# Patient Record
Sex: FEMALE | Race: WHITE | NOT HISPANIC OR LATINO | Employment: FULL TIME | URBAN - METROPOLITAN AREA
[De-identification: names, ages, dates, MRNs, and addresses within clinical notes are randomized per-mention and may not be internally consistent; named-entity substitution may affect disease eponyms.]

---

## 2020-08-14 ENCOUNTER — HOSPITAL ENCOUNTER (OUTPATIENT)
Dept: OTHER | Facility: HOSPITAL | Age: 45
Discharge: HOME | End: 2020-08-14
Attending: EMERGENCY MEDICINE

## 2020-08-15 LAB — RUBV IGG SER-ACNC: 126.9 [IU]/ML

## 2020-08-16 LAB — HBV SURFACE AB SER-ACNC: 363.3 MIU/ML

## 2020-08-17 LAB
CONV MUMPS ANTIBODY IGG: <9 AU/ML
MEV IGG SER IA-ACNC: 49.8 AU/ML
VZV IGG SER IA-ACNC: 420 INDEX

## 2020-08-19 LAB
CONV QUANTIFERON TB GOLD: NEGATIVE
QUANTIFERON CRITERIA: NORMAL
QUANTIFERON MITOGEN VALUE: >10 IU/ML
QUANTIFERON NIL VALUE: 0.06 IU/ML
QUANTIFERON TB1 AG VALUE: 0.06 IU/ML
QUANTIFERON TB2 AG VALUE: 0.07 IU/ML

## 2021-01-06 ENCOUNTER — HOSPITAL ENCOUNTER (OUTPATIENT)
Dept: OTHER | Facility: HOSPITAL | Age: 46
Discharge: HOME | End: 2021-01-06
Attending: INTERNAL MEDICINE

## 2021-02-02 ENCOUNTER — HOSPITAL ENCOUNTER (OUTPATIENT)
Dept: VACCINE CLINIC | Facility: HOSPITAL | Age: 46
Discharge: HOME | End: 2021-02-02
Attending: INTERNAL MEDICINE

## 2022-02-10 ENCOUNTER — LAB (OUTPATIENT)
Dept: LAB | Facility: HOSPITAL | Age: 47
End: 2022-02-10

## 2022-02-10 ENCOUNTER — TRANSCRIBE ORDERS (OUTPATIENT)
Dept: LAB | Facility: HOSPITAL | Age: 47
End: 2022-02-10

## 2022-02-10 DIAGNOSIS — Z79.899 ENCOUNTER FOR LONG-TERM (CURRENT) USE OF OTHER MEDICATIONS: ICD-10-CM

## 2022-02-10 DIAGNOSIS — E03.9 HYPOTHYROIDISM, UNSPECIFIED TYPE: ICD-10-CM

## 2022-02-10 DIAGNOSIS — E03.9 HYPOTHYROIDISM, UNSPECIFIED TYPE: Primary | ICD-10-CM

## 2022-02-10 LAB
BACTERIA UR QL AUTO: NORMAL /HPF
BASOPHILS # BLD AUTO: 0.09 10*3/MM3 (ref 0–0.2)
BASOPHILS NFR BLD AUTO: 1.2 % (ref 0–1.5)
BILIRUB UR QL STRIP: NEGATIVE
CLARITY UR: CLEAR
COLOR UR: YELLOW
CRP SERPL-MCNC: 1.49 MG/DL (ref 0–0.5)
DEPRECATED RDW RBC AUTO: 39.8 FL (ref 37–54)
EOSINOPHIL # BLD AUTO: 0.13 10*3/MM3 (ref 0–0.4)
EOSINOPHIL NFR BLD AUTO: 1.7 % (ref 0.3–6.2)
ERYTHROCYTE [DISTWIDTH] IN BLOOD BY AUTOMATED COUNT: 14 % (ref 12.3–15.4)
ERYTHROCYTE [SEDIMENTATION RATE] IN BLOOD: 18 MM/HR (ref 0–20)
GLUCOSE UR STRIP-MCNC: NEGATIVE MG/DL
HCT VFR BLD AUTO: 33.9 % (ref 34–46.6)
HCV AB SER DONR QL: NORMAL
HGB BLD-MCNC: 11.1 G/DL (ref 12–15.9)
HGB UR QL STRIP.AUTO: NEGATIVE
HYALINE CASTS UR QL AUTO: NORMAL /LPF
IMM GRANULOCYTES # BLD AUTO: 0.01 10*3/MM3 (ref 0–0.05)
IMM GRANULOCYTES NFR BLD AUTO: 0.1 % (ref 0–0.5)
KETONES UR QL STRIP: NEGATIVE
LEUKOCYTE ESTERASE UR QL STRIP.AUTO: ABNORMAL
LYMPHOCYTES # BLD AUTO: 2.17 10*3/MM3 (ref 0.7–3.1)
LYMPHOCYTES NFR BLD AUTO: 29 % (ref 19.6–45.3)
MCH RBC QN AUTO: 26.1 PG (ref 26.6–33)
MCHC RBC AUTO-ENTMCNC: 32.7 G/DL (ref 31.5–35.7)
MCV RBC AUTO: 79.8 FL (ref 79–97)
MONOCYTES # BLD AUTO: 0.49 10*3/MM3 (ref 0.1–0.9)
MONOCYTES NFR BLD AUTO: 6.5 % (ref 5–12)
NEUTROPHILS NFR BLD AUTO: 4.6 10*3/MM3 (ref 1.7–7)
NEUTROPHILS NFR BLD AUTO: 61.5 % (ref 42.7–76)
NITRITE UR QL STRIP: NEGATIVE
NRBC BLD AUTO-RTO: 0 /100 WBC (ref 0–0.2)
PH UR STRIP.AUTO: 8.5 [PH] (ref 5–8)
PLATELET # BLD AUTO: 327 10*3/MM3 (ref 140–450)
PMV BLD AUTO: 11.1 FL (ref 6–12)
PROT UR QL STRIP: ABNORMAL
RBC # BLD AUTO: 4.25 10*6/MM3 (ref 3.77–5.28)
RBC # UR STRIP: NORMAL /HPF
REF LAB TEST METHOD: NORMAL
SP GR UR STRIP: 1.02 (ref 1–1.03)
SQUAMOUS #/AREA URNS HPF: NORMAL /HPF
T4 FREE SERPL-MCNC: 0.91 NG/DL (ref 0.93–1.7)
TSH SERPL DL<=0.05 MIU/L-ACNC: 1.65 UIU/ML (ref 0.27–4.2)
UROBILINOGEN UR QL STRIP: ABNORMAL
WBC # UR STRIP: NORMAL /HPF
WBC NRBC COR # BLD: 7.49 10*3/MM3 (ref 3.4–10.8)

## 2022-02-10 PROCEDURE — 81001 URINALYSIS AUTO W/SCOPE: CPT

## 2022-02-10 PROCEDURE — 86140 C-REACTIVE PROTEIN: CPT

## 2022-02-10 PROCEDURE — 84432 ASSAY OF THYROGLOBULIN: CPT

## 2022-02-10 PROCEDURE — 85025 COMPLETE CBC W/AUTO DIFF WBC: CPT

## 2022-02-10 PROCEDURE — 84439 ASSAY OF FREE THYROXINE: CPT

## 2022-02-10 PROCEDURE — 86376 MICROSOMAL ANTIBODY EACH: CPT

## 2022-02-10 PROCEDURE — 86803 HEPATITIS C AB TEST: CPT

## 2022-02-10 PROCEDURE — 85652 RBC SED RATE AUTOMATED: CPT

## 2022-02-10 PROCEDURE — 84443 ASSAY THYROID STIM HORMONE: CPT

## 2022-02-10 PROCEDURE — 36415 COLL VENOUS BLD VENIPUNCTURE: CPT

## 2022-02-11 LAB — THYROPEROXIDASE AB SERPL-ACNC: <8 IU/ML (ref 0–34)

## 2022-02-24 LAB — THYROGLOB SERPL-MCNC: 9.1 NG/ML

## 2022-07-18 ENCOUNTER — OFFICE VISIT (OUTPATIENT)
Dept: FAMILY MEDICINE CLINIC | Facility: CLINIC | Age: 47
End: 2022-07-18

## 2022-07-18 VITALS
OXYGEN SATURATION: 100 % | HEART RATE: 54 BPM | WEIGHT: 222.4 LBS | TEMPERATURE: 98.1 F | DIASTOLIC BLOOD PRESSURE: 84 MMHG | BODY MASS INDEX: 34.91 KG/M2 | SYSTOLIC BLOOD PRESSURE: 122 MMHG | HEIGHT: 67 IN

## 2022-07-18 DIAGNOSIS — D64.9 ANEMIA, UNSPECIFIED TYPE: ICD-10-CM

## 2022-07-18 DIAGNOSIS — E03.9 HYPOTHYROIDISM, UNSPECIFIED TYPE: ICD-10-CM

## 2022-07-18 DIAGNOSIS — Z51.81 MEDICATION MONITORING ENCOUNTER: ICD-10-CM

## 2022-07-18 DIAGNOSIS — E78.5 BORDERLINE HYPERLIPIDEMIA: ICD-10-CM

## 2022-07-18 DIAGNOSIS — F41.9 ANXIETY: Primary | ICD-10-CM

## 2022-07-18 DIAGNOSIS — L21.9 SEBORRHEIC DERMATITIS: ICD-10-CM

## 2022-07-18 DIAGNOSIS — F32.A DEPRESSION, UNSPECIFIED DEPRESSION TYPE: ICD-10-CM

## 2022-07-18 DIAGNOSIS — Z12.31 VISIT FOR SCREENING MAMMOGRAM: ICD-10-CM

## 2022-07-18 DIAGNOSIS — E55.9 VITAMIN D DEFICIENCY: ICD-10-CM

## 2022-07-18 DIAGNOSIS — R41.840 INATTENTION: ICD-10-CM

## 2022-07-18 DIAGNOSIS — J30.9 ALLERGIC RHINITIS, UNSPECIFIED SEASONALITY, UNSPECIFIED TRIGGER: ICD-10-CM

## 2022-07-18 PROCEDURE — 99204 OFFICE O/P NEW MOD 45 MIN: CPT | Performed by: FAMILY MEDICINE

## 2022-07-18 RX ORDER — BUSPIRONE HYDROCHLORIDE 7.5 MG/1
7.5 TABLET ORAL 2 TIMES DAILY
Qty: 60 TABLET | Refills: 1 | Status: SHIPPED | OUTPATIENT
Start: 2022-07-18 | End: 2022-08-18

## 2022-07-18 RX ORDER — KETOCONAZOLE 20 MG/ML
SHAMPOO TOPICAL 2 TIMES WEEKLY
Qty: 120 ML | Refills: 0 | Status: SHIPPED | OUTPATIENT
Start: 2022-07-18 | End: 2022-12-12

## 2022-07-18 RX ORDER — BUPROPION HCL 300 MG
TABLET, EXTENDED RELEASE 24 HR ORAL
COMMUNITY
Start: 2022-05-11 | End: 2022-07-22 | Stop reason: SDUPTHER

## 2022-07-18 NOTE — PROGRESS NOTES
Chief Complaint  Establish Care  Discuss medications  Concerns about adhd  Skin rash    Subjective        Maine Olguin presents to [facility]  History of Present Illness  Patient presents today to establish care with myself.  Previously being seen on [place].  She reports a history of postpartum anxiety and potentially some issues with depression as well.  She was recently increased on Wellbutrin to take 300 mg daily.  This was an increase from the previous 150 mg.  It was changed about 6 or so weeks ago.  She thinks she has had some benefit from the increase but is not quite sure yet.  She does not take anything actively for anxiety as we did discuss that Wellbutrin is typically not used for anxiety.  She does have concerns about ADHD.  She reports that others have told her this concern as well.  She did fill out a screening today where she had issues in 4 out of the 6 areas.  She admits that the Wellbutrin has not helped out for ADHD.  As far as anxiety and depression are concerned she reports that she had side effects taking Paxil, sertraline and Effexor and that Wellbutrin has been 1 medication and is somewhat helped.  She admits to not getting regular care and would like to have some of these issues sorted out.  She was previously seen by rheumatology, Dr. Ruffin as she had an elevated CRP and NGUYEN.  She was not diagnosed with any rheumatological condition per patient report.  She has a rash around her face that has been recurring.  She reports that the topical hydrocortisone that is 2.5% has helped out but she reports that it will come back.  She was also previously given ketoconazole shampoo.  She reports being seen by dermatology so we will request the records.  I reviewed her labs she has had anemia.  She reports that her hemoglobin is always around 11.  She also reports previous issues with vitamin D deficiency and hyperlipidemia.  Plan to have her labs done for further  "evaluation.  She is due for mammogram.  She denies any abnormal mammograms.  She does report previously having thyroid levels that were off.  She reports that the free T4 and a TSH were low.  Back in February she had a normal TSH with a slightly low free T4 at 0.91.  I plan to have this lab also repeated.  She did have thyroid peroxidase antibodies checked which were negative as well as thyroglobulin antibodies which were negative.    Objective   Vital Signs:  /84   Pulse 54   Temp 98.1 °F (36.7 °C)   Ht 170.2 cm (67\")   Wt 101 kg (222 lb 6.4 oz)   SpO2 100%   BMI 34.83 kg/m²   Estimated body mass index is 34.83 kg/m² as calculated from the following:    Height as of this encounter: 170.2 cm (67\").    Weight as of this encounter: 101 kg (222 lb 6.4 oz).          Physical Exam   General: AAO ×3, no acute distress, pleasant  HEENT: Normocephalic, atraumatic, no discharge in the eyes, no discharge from the nose, no oropharyngeal erythema or exudates, and TMs intact bilaterally with no erythema, no cervical tenderness or lymphadenopathy  Skin: Seborrheic dermatitis noted around her eyes and bridge of nose.  There is erythema with slight scale.  Cardiovascular: Regular rate and rhythm without appreciable murmur  Respiratory: Clear to auscultation bilaterally no RRW  Gastrointestinal: Soft nontender nondistended with bowel sounds present  extremities: No edema  Neurologic: CN II through XII grossly intact   Psychiatric: Normal mood and affect  Result Review :                Assessment and Plan   Diagnoses and all orders for this visit:    1. Anxiety (Primary)    2. Hypothyroidism, unspecified type  -     TSH+Free T4; Future    3. Allergic rhinitis, unspecified seasonality, unspecified trigger    4. Inattention    5. Visit for screening mammogram  -     Mammo Screening Digital Tomosynthesis Bilateral With CAD; Future    6. Seborrheic dermatitis    7. Anemia, unspecified type  -     Vitamin B12; Future  -     " Folate; Future  -     CBC & Differential; Future  -     Comprehensive Metabolic Panel; Future  -     Iron Profile; Future    8. Vitamin D deficiency  -     Vitamin D 25 Hydroxy; Future    9. Borderline hyperlipidemia  -     Lipid Panel; Future    10. Medication monitoring encounter  -     CBC & Differential; Future  -     Comprehensive Metabolic Panel; Future    11. Depression, unspecified depression type    Other orders  -     busPIRone (BUSPAR) 7.5 MG tablet; Take 1 tablet by mouth 2 (Two) Times a Day.  Dispense: 60 tablet; Refill: 1  -     ketoconazole (NIZORAL) 2 % shampoo; Apply  topically to the appropriate area as directed 2 (Two) Times a Week.  Dispense: 120 mL; Refill: 0      I discussed with patient seeing her back in 1 month for close follow-up.  For anxiety I would like to start her on BuSpar 7.5 mg twice daily.  We discussed continue with Wellbutrin at this time with further recommendations to follow depending on clinical course.  I did encourage her to get her labs done prior to the next appointment.  There are some concerns about inattention.  We will hold off on treatment at this time but may consider this in the future.  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  I did discuss with patient screening for colon cancer.  She would like to think about the options provided today including Cologuard and a colonoscopy.       Follow Up   Return in about 1 month (around 8/18/2022) for anxiety/depression.  Patient was given instructions and counseling regarding her condition or for health maintenance advice. Please see specific information pulled into the AVS if appropriate.

## 2022-07-21 ENCOUNTER — PATIENT MESSAGE (OUTPATIENT)
Dept: FAMILY MEDICINE CLINIC | Facility: CLINIC | Age: 47
End: 2022-07-21

## 2022-07-22 ENCOUNTER — PATIENT ROUNDING (BHMG ONLY) (OUTPATIENT)
Dept: FAMILY MEDICINE CLINIC | Facility: CLINIC | Age: 47
End: 2022-07-22

## 2022-07-22 RX ORDER — BUPROPION HCL 300 MG
300 TABLET, EXTENDED RELEASE 24 HR ORAL DAILY
Qty: 90 TABLET | Refills: 1 | Status: SHIPPED | OUTPATIENT
Start: 2022-07-22 | End: 2022-07-25 | Stop reason: SDUPTHER

## 2022-07-22 NOTE — TELEPHONE ENCOUNTER
From: Maine Olguin  To: Tanmay Mayberry DO  Sent: 7/21/2022 8:35 AM EDT  Subject: Wellbutrin Prescription    Good morning, Dr. Mayberry,    I may have not told you at my visit this week that I need a prescription for the Wellbutrin HCl 300mg XL. My current fill from my previous provider only has five tablets remaining.    Thank you,  Maine

## 2022-07-22 NOTE — PROGRESS NOTES
July 22, 2022    Hello, may I speak with Maine Olguin?    My name is Linus Galvez    I am  with [facility]  [address]  [address]  [place] [address]  [phone].    Before we get started may I verify your date of birth? 1975    I am calling to officially welcome you to our practice and ask about your recent visit. Is this a good time to talk? yes    Tell me about your visit with us. What things went well?  pt was upset that she had to do the new pt paper work more than once. I loved my provider the paperwork was more of an inconvenience        We're always looking for ways to make our patients' experiences even better. Do you have recommendations on ways we may improve?  no    Overall were you satisfied with your first visit to our practice? yes       I appreciate you taking the time to speak with me today. Is there anything else I can do for you? no      Thank you, and have a great day.

## 2022-07-25 RX ORDER — BUPROPION HYDROCHLORIDE 300 MG/1
300 TABLET ORAL DAILY
Qty: 90 TABLET | Refills: 1 | Status: SHIPPED | OUTPATIENT
Start: 2022-07-25 | End: 2022-08-18 | Stop reason: SDUPTHER

## 2022-07-25 RX ORDER — BUPROPION HYDROCHLORIDE 300 MG/1
300 TABLET ORAL DAILY
Qty: 90 TABLET | Refills: 1 | Status: SHIPPED | OUTPATIENT
Start: 2022-07-25 | End: 2022-07-25

## 2022-08-17 ENCOUNTER — LAB (OUTPATIENT)
Dept: LAB | Facility: HOSPITAL | Age: 47
End: 2022-08-17

## 2022-08-17 DIAGNOSIS — E03.9 HYPOTHYROIDISM, UNSPECIFIED TYPE: ICD-10-CM

## 2022-08-17 DIAGNOSIS — E55.9 VITAMIN D DEFICIENCY: ICD-10-CM

## 2022-08-17 DIAGNOSIS — E78.5 BORDERLINE HYPERLIPIDEMIA: ICD-10-CM

## 2022-08-17 DIAGNOSIS — D64.9 ANEMIA, UNSPECIFIED TYPE: ICD-10-CM

## 2022-08-17 DIAGNOSIS — Z51.81 MEDICATION MONITORING ENCOUNTER: ICD-10-CM

## 2022-08-17 LAB
25(OH)D3 SERPL-MCNC: 38.4 NG/ML (ref 30–100)
ALBUMIN SERPL-MCNC: 4.4 G/DL (ref 3.5–5.2)
ALBUMIN/GLOB SERPL: 1.5 G/DL
ALP SERPL-CCNC: 96 U/L (ref 39–117)
ALT SERPL W P-5'-P-CCNC: 10 U/L (ref 1–33)
ANION GAP SERPL CALCULATED.3IONS-SCNC: 11.9 MMOL/L (ref 5–15)
AST SERPL-CCNC: 13 U/L (ref 1–32)
BASOPHILS # BLD AUTO: 0.08 10*3/MM3 (ref 0–0.2)
BASOPHILS NFR BLD AUTO: 1.3 % (ref 0–1.5)
BILIRUB SERPL-MCNC: 0.3 MG/DL (ref 0–1.2)
BUN SERPL-MCNC: 13 MG/DL (ref 6–20)
BUN/CREAT SERPL: 14.1 (ref 7–25)
CALCIUM SPEC-SCNC: 9.6 MG/DL (ref 8.6–10.5)
CHLORIDE SERPL-SCNC: 99 MMOL/L (ref 98–107)
CHOLEST SERPL-MCNC: 181 MG/DL (ref 0–200)
CO2 SERPL-SCNC: 27.1 MMOL/L (ref 22–29)
CREAT SERPL-MCNC: 0.92 MG/DL (ref 0.57–1)
DEPRECATED RDW RBC AUTO: 40.9 FL (ref 37–54)
EGFRCR SERPLBLD CKD-EPI 2021: 78.4 ML/MIN/1.73
EOSINOPHIL # BLD AUTO: 0.1 10*3/MM3 (ref 0–0.4)
EOSINOPHIL NFR BLD AUTO: 1.6 % (ref 0.3–6.2)
ERYTHROCYTE [DISTWIDTH] IN BLOOD BY AUTOMATED COUNT: 13.8 % (ref 12.3–15.4)
FOLATE SERPL-MCNC: 14.5 NG/ML (ref 4.78–24.2)
GLOBULIN UR ELPH-MCNC: 2.9 GM/DL
GLUCOSE SERPL-MCNC: 103 MG/DL (ref 65–99)
HCT VFR BLD AUTO: 36.7 % (ref 34–46.6)
HDLC SERPL-MCNC: 48 MG/DL (ref 40–60)
HGB BLD-MCNC: 11.9 G/DL (ref 12–15.9)
IMM GRANULOCYTES # BLD AUTO: 0.02 10*3/MM3 (ref 0–0.05)
IMM GRANULOCYTES NFR BLD AUTO: 0.3 % (ref 0–0.5)
IRON 24H UR-MRATE: 41 MCG/DL (ref 37–145)
IRON SATN MFR SERPL: 11 % (ref 20–50)
LDLC SERPL CALC-MCNC: 114 MG/DL (ref 0–100)
LDLC/HDLC SERPL: 2.34 {RATIO}
LYMPHOCYTES # BLD AUTO: 2.04 10*3/MM3 (ref 0.7–3.1)
LYMPHOCYTES NFR BLD AUTO: 32.5 % (ref 19.6–45.3)
MCH RBC QN AUTO: 26.7 PG (ref 26.6–33)
MCHC RBC AUTO-ENTMCNC: 32.4 G/DL (ref 31.5–35.7)
MCV RBC AUTO: 82.3 FL (ref 79–97)
MONOCYTES # BLD AUTO: 0.48 10*3/MM3 (ref 0.1–0.9)
MONOCYTES NFR BLD AUTO: 7.6 % (ref 5–12)
NEUTROPHILS NFR BLD AUTO: 3.56 10*3/MM3 (ref 1.7–7)
NEUTROPHILS NFR BLD AUTO: 56.7 % (ref 42.7–76)
NRBC BLD AUTO-RTO: 0 /100 WBC (ref 0–0.2)
PLATELET # BLD AUTO: 325 10*3/MM3 (ref 140–450)
PMV BLD AUTO: 10.9 FL (ref 6–12)
POTASSIUM SERPL-SCNC: 4 MMOL/L (ref 3.5–5.2)
PROT SERPL-MCNC: 7.3 G/DL (ref 6–8.5)
RBC # BLD AUTO: 4.46 10*6/MM3 (ref 3.77–5.28)
SODIUM SERPL-SCNC: 138 MMOL/L (ref 136–145)
T4 FREE SERPL-MCNC: 1.09 NG/DL (ref 0.93–1.7)
TIBC SERPL-MCNC: 389 MCG/DL (ref 298–536)
TRANSFERRIN SERPL-MCNC: 261 MG/DL (ref 200–360)
TRIGL SERPL-MCNC: 104 MG/DL (ref 0–150)
TSH SERPL DL<=0.05 MIU/L-ACNC: 1.93 UIU/ML (ref 0.27–4.2)
VIT B12 BLD-MCNC: 469 PG/ML (ref 211–946)
VLDLC SERPL-MCNC: 19 MG/DL (ref 5–40)
WBC NRBC COR # BLD: 6.28 10*3/MM3 (ref 3.4–10.8)

## 2022-08-17 PROCEDURE — 80053 COMPREHEN METABOLIC PANEL: CPT

## 2022-08-17 PROCEDURE — 84466 ASSAY OF TRANSFERRIN: CPT

## 2022-08-17 PROCEDURE — 84443 ASSAY THYROID STIM HORMONE: CPT

## 2022-08-17 PROCEDURE — 84439 ASSAY OF FREE THYROXINE: CPT

## 2022-08-17 PROCEDURE — 80061 LIPID PANEL: CPT

## 2022-08-17 PROCEDURE — 85025 COMPLETE CBC W/AUTO DIFF WBC: CPT

## 2022-08-17 PROCEDURE — 36415 COLL VENOUS BLD VENIPUNCTURE: CPT

## 2022-08-17 PROCEDURE — 82607 VITAMIN B-12: CPT

## 2022-08-17 PROCEDURE — 82746 ASSAY OF FOLIC ACID SERUM: CPT

## 2022-08-17 PROCEDURE — 82306 VITAMIN D 25 HYDROXY: CPT

## 2022-08-17 PROCEDURE — 83540 ASSAY OF IRON: CPT

## 2022-08-18 ENCOUNTER — OFFICE VISIT (OUTPATIENT)
Dept: FAMILY MEDICINE CLINIC | Facility: CLINIC | Age: 47
End: 2022-08-18

## 2022-08-18 VITALS
HEART RATE: 55 BPM | TEMPERATURE: 98.1 F | HEIGHT: 67 IN | SYSTOLIC BLOOD PRESSURE: 122 MMHG | DIASTOLIC BLOOD PRESSURE: 88 MMHG | BODY MASS INDEX: 34.75 KG/M2 | WEIGHT: 221.4 LBS | OXYGEN SATURATION: 100 %

## 2022-08-18 DIAGNOSIS — E78.00 PURE HYPERCHOLESTEROLEMIA: ICD-10-CM

## 2022-08-18 DIAGNOSIS — D50.9 IRON DEFICIENCY ANEMIA, UNSPECIFIED IRON DEFICIENCY ANEMIA TYPE: ICD-10-CM

## 2022-08-18 DIAGNOSIS — F32.A DEPRESSION, UNSPECIFIED DEPRESSION TYPE: ICD-10-CM

## 2022-08-18 DIAGNOSIS — Z12.11 SCREENING FOR COLON CANCER: ICD-10-CM

## 2022-08-18 DIAGNOSIS — F41.9 ANXIETY: Primary | ICD-10-CM

## 2022-08-18 PROCEDURE — 99214 OFFICE O/P EST MOD 30 MIN: CPT | Performed by: FAMILY MEDICINE

## 2022-08-18 RX ORDER — BUPROPION HYDROCHLORIDE 300 MG/1
300 TABLET ORAL DAILY
Qty: 90 TABLET | Refills: 1 | Status: SHIPPED | OUTPATIENT
Start: 2022-08-18

## 2022-08-18 RX ORDER — BUSPIRONE HYDROCHLORIDE 15 MG/1
15 TABLET ORAL 2 TIMES DAILY
Qty: 180 TABLET | Refills: 1 | Status: SHIPPED | OUTPATIENT
Start: 2022-08-18

## 2022-08-18 NOTE — PROGRESS NOTES
"Chief Complaint  Anxiety   Depression  Discuss lab work    Subjective        Maine Olguin presents to [facility]  History of Present Illness  Patient presents today to follow-up for anxiety and depression.  She started taking BuSpar which she admits has provided some benefit.  She still sometimes will feel on the edge.  She is taking Wellbutrin has been on previous antidepressants as well.  We did discuss adding counseling today to help out with depression.  She is here to discuss her lab work.  Her lipid panel showed her LDL was at 114.  We discussed lifestyle changes.  She has mild anemia with a low iron saturation.  She reports that she has been anemic most of her adult life.  We did discuss taking a multivitamin containing iron.  We did discuss on the last visit colon cancer screening and she is agreeable to a Cologuard test.  She does have a slightly elevated glucose level but normal renal function normal LFTs.  CBC shows hemoglobin 11.9.  Folate, B12, vitamin D and thyroid levels are all adequate.  Objective   Vital Signs:  /88   Pulse 55   Temp 98.1 °F (36.7 °C)   Ht 170.2 cm (67\")   Wt 100 kg (221 lb 6.4 oz)   SpO2 100%   BMI 34.68 kg/m²   Estimated body mass index is 34.68 kg/m² as calculated from the following:    Height as of this encounter: 170.2 cm (67\").    Weight as of this encounter: 100 kg (221 lb 6.4 oz).          Physical Exam   General: AAO ×3, no acute distress, pleasant  HEENT: Normocephalic, atraumatic  Cardiovascular: Regular rate and rhythm without appreciable murmur  Respiratory: Clear to auscultation bilaterally no RRW  Gastrointestinal: Soft nontender nondistended with bowel sounds present  extremities: No edema  Neurologic: CN II through XII grossly intact   Psychiatric: Normal mood and affect  Result Review :{Labs  Result Review  Imaging  Med Tab  Media  Procedures  :23}                Assessment and Plan   Diagnoses and all orders " for this visit:    1. Anxiety (Primary)    2. Depression, unspecified depression type    3. Pure hypercholesterolemia    4. Iron deficiency anemia, unspecified iron deficiency anemia type    5. Screening for colon cancer  -     Cologuard - Stool, Per Rectum; Future    Other orders  -     busPIRone (BUSPAR) 15 MG tablet; Take 1 tablet by mouth 2 (Two) Times a Day.  Dispense: 180 tablet; Refill: 1  -     buPROPion XL (Wellbutrin XL) 300 MG 24 hr tablet; Take 1 tablet by mouth Daily.  Dispense: 90 tablet; Refill: 1    I discussed with patient treatment for anxiety and depression.  We discussed counseling today.  We discussed continue Wellbutrin and increasing BuSpar to take 15 mg twice a day.  Plan to see patient back in 2 months or sooner if needed.  As far as hypercholesterolemia is concerned we discussed working on lifestyle changes.  She will add a multivitamin for iron deficiency anemia.  Also I will order Cologuard test.         Follow Up   Return in about 2 months (around 10/18/2022) for anxiety/depression.  Patient was given instructions and counseling regarding her condition or for health maintenance advice. Please see specific information pulled into the AVS if appropriate.

## 2022-09-20 ENCOUNTER — APPOINTMENT (OUTPATIENT)
Dept: MAMMOGRAPHY | Facility: HOSPITAL | Age: 47
End: 2022-09-20

## 2022-10-27 ENCOUNTER — OFFICE VISIT (OUTPATIENT)
Dept: FAMILY MEDICINE CLINIC | Facility: CLINIC | Age: 47
End: 2022-10-27

## 2022-10-27 VITALS
SYSTOLIC BLOOD PRESSURE: 122 MMHG | DIASTOLIC BLOOD PRESSURE: 74 MMHG | BODY MASS INDEX: 34.37 KG/M2 | WEIGHT: 219 LBS | OXYGEN SATURATION: 99 % | TEMPERATURE: 98.4 F | HEART RATE: 79 BPM | HEIGHT: 67 IN

## 2022-10-27 DIAGNOSIS — E78.00 PURE HYPERCHOLESTEROLEMIA: ICD-10-CM

## 2022-10-27 DIAGNOSIS — R73.09 ABNORMAL GLUCOSE: ICD-10-CM

## 2022-10-27 DIAGNOSIS — Z23 NEED FOR INFLUENZA VACCINATION: Primary | ICD-10-CM

## 2022-10-27 DIAGNOSIS — D50.9 IRON DEFICIENCY ANEMIA, UNSPECIFIED IRON DEFICIENCY ANEMIA TYPE: ICD-10-CM

## 2022-10-27 DIAGNOSIS — Z12.11 SCREENING FOR COLON CANCER: ICD-10-CM

## 2022-10-27 DIAGNOSIS — F32.A DEPRESSION, UNSPECIFIED DEPRESSION TYPE: ICD-10-CM

## 2022-10-27 DIAGNOSIS — F41.9 ANXIETY: ICD-10-CM

## 2022-10-27 DIAGNOSIS — R53.83 OTHER FATIGUE: ICD-10-CM

## 2022-10-27 DIAGNOSIS — Z51.81 MEDICATION MONITORING ENCOUNTER: ICD-10-CM

## 2022-10-27 PROCEDURE — 90686 IIV4 VACC NO PRSV 0.5 ML IM: CPT | Performed by: FAMILY MEDICINE

## 2022-10-27 PROCEDURE — 90471 IMMUNIZATION ADMIN: CPT | Performed by: FAMILY MEDICINE

## 2022-10-27 PROCEDURE — 99214 OFFICE O/P EST MOD 30 MIN: CPT | Performed by: FAMILY MEDICINE

## 2022-10-27 NOTE — PROGRESS NOTES
"Chief Complaint  Anxiety  Depression    Subjective        Maine Olguin presents to [facility]  History of Present Illness  Patient presents today to follow-up for anxiety and depression.  She reports that she is doing much better now taking BuSpar as well as Wellbutrin.  She is breaking the BuSpar in 11:30 times and taking 7.5 mg but still occasionally requires the 15 mg.  We discussed continue current management at this time.  Her Cologuard test was negative.  We did discuss having her labs repeated when she returns for follow-up.  Previously noted to have anemia with slightly low iron levels.  I did discuss with her previously taking over-the-counter iron supplementation.  I plan to check an A1c with her next lab work as well.  Objective   Vital Signs:  /74   Pulse 79   Temp 98.4 °F (36.9 °C)   Ht 170.2 cm (67\")   Wt 99.3 kg (219 lb)   SpO2 99%   BMI 34.30 kg/m²   Estimated body mass index is 34.3 kg/m² as calculated from the following:    Height as of this encounter: 170.2 cm (67\").    Weight as of this encounter: 99.3 kg (219 lb).          Physical Exam   General: AAO ×3, no acute distress, pleasant  HEENT: Normocephalic, atraumatic  Cardiovascular: Regular rate and rhythm without appreciable murmur  Respiratory: Clear to auscultation bilaterally no RRW  Gastrointestinal: Soft nontender nondistended with bowel sounds present  extremities: No edema  Neurologic: CN II through XII grossly intact   Psychiatric: Normal mood and affect  Result Review :                Assessment and Plan   Diagnoses and all orders for this visit:    1. Anxiety (Primary)    2. Need for influenza vaccination    3. Depression, unspecified depression type    4. Screening for colon cancer    5. Pure hypercholesterolemia  -     Lipid Panel; Future    6. Abnormal glucose  -     Hemoglobin A1c; Future    7. Other fatigue  -     TSH+Free T4; Future    8. Medication monitoring encounter  -     CBC " & Differential; Future  -     Comprehensive Metabolic Panel; Future  -     Hemoglobin A1c; Future  -     Iron Profile; Future  -     Lipid Panel; Future  -     TSH+Free T4; Future    9. Iron deficiency anemia, unspecified iron deficiency anemia type  -     CBC & Differential; Future  -     Iron Profile; Future    I discussed with patient continue current management for anxiety and depression.  We discussed having labs repeated prior to next appointment.  Plan to see patient back in May for a yearly physical.         Follow Up   Return in about 7 months (around 5/27/2023) for anxiety.  Patient was given instructions and counseling regarding her condition or for health maintenance advice. Please see specific information pulled into the AVS if appropriate.

## 2022-12-12 ENCOUNTER — OFFICE VISIT (OUTPATIENT)
Dept: FAMILY MEDICINE CLINIC | Facility: CLINIC | Age: 47
End: 2022-12-12

## 2022-12-12 ENCOUNTER — HOSPITAL ENCOUNTER (OUTPATIENT)
Dept: MAMMOGRAPHY | Facility: HOSPITAL | Age: 47
Discharge: HOME | End: 2022-12-12
Admitting: FAMILY MEDICINE

## 2022-12-12 VITALS
DIASTOLIC BLOOD PRESSURE: 84 MMHG | WEIGHT: 220.1 LBS | TEMPERATURE: 98.5 F | SYSTOLIC BLOOD PRESSURE: 132 MMHG | HEART RATE: 69 BPM | OXYGEN SATURATION: 99 % | BODY MASS INDEX: 34.55 KG/M2 | HEIGHT: 67 IN

## 2022-12-12 DIAGNOSIS — Z12.31 VISIT FOR SCREENING MAMMOGRAM: ICD-10-CM

## 2022-12-12 DIAGNOSIS — J06.9 UPPER RESPIRATORY TRACT INFECTION, UNSPECIFIED TYPE: ICD-10-CM

## 2022-12-12 DIAGNOSIS — D49.2 SKIN NEOPLASM: Primary | ICD-10-CM

## 2022-12-12 PROCEDURE — 77067 SCR MAMMO BI INCL CAD: CPT

## 2022-12-12 PROCEDURE — 77063 BREAST TOMOSYNTHESIS BI: CPT

## 2022-12-12 PROCEDURE — 99213 OFFICE O/P EST LOW 20 MIN: CPT | Performed by: FAMILY MEDICINE

## 2022-12-12 RX ORDER — AMOXICILLIN AND CLAVULANATE POTASSIUM 875; 125 MG/1; MG/1
TABLET, FILM COATED ORAL 2 TIMES DAILY
COMMUNITY
Start: 2022-12-08 | End: 2022-12-15

## 2022-12-12 RX ORDER — PSEUDOEPHEDRINE HCL 120 MG/1
120 TABLET, FILM COATED, EXTENDED RELEASE ORAL EVERY 12 HOURS
Qty: 20 TABLET | Refills: 0 | Status: SHIPPED | OUTPATIENT
Start: 2022-12-12 | End: 2022-12-22

## 2022-12-12 NOTE — PROGRESS NOTES
"Chief Complaint  Skin lesion on the right shoulder  Follow up from [facility] JAS Olguin presents to [facility]  History of Present Illness  Patient presents today to discuss a skin lesion on the right shoulder that has been present for the past couple years.  She has a friend that works in healthcare that was concerned and wanted this addressed.  Patient is concerned about the skin lesion potentially being a skin cancer.  She reports it has not grown in 2 years.  She went on Thursday to urgent care which was 12/8/2022 and was given Augmentin for upper respiratory infection.  She is still having some drainage down the back of her throat.  She reports that she tested negative for strep.  Symptoms have been going on for about 18 days prior to presenting at urgent care.  Objective   Vital Signs:  /84 (BP Location: Left arm, Patient Position: Sitting)   Pulse 69   Temp 98.5 °F (36.9 °C) (Oral)   Ht 170.2 cm (67\")   Wt 99.8 kg (220 lb 1.6 oz)   SpO2 99%   BMI 34.47 kg/m²   Estimated body mass index is 34.47 kg/m² as calculated from the following:    Height as of this encounter: 170.2 cm (67\").    Weight as of this encounter: 99.8 kg (220 lb 1.6 oz).          Physical Exam   General: AAO ×3, no acute distress, pleasant  HEENT: Normocephalic, atraumatic, no discharge in the eyes, nasal congestion noted left side worse than right, no oropharyngeal erythema or exudates, and TMs intact bilaterally with no erythema, no cervical tenderness or lymphadenopathy  Cardiovascular: Regular rate and rhythm without appreciable murmur  Respiratory: Clear to auscultation bilaterally no RRW  Skin: Pearly lesion with telangiectasia slightly raised above the skin with sharply demarcated borders noted on the right shoulder area.  This measures 6 x 5 mm.  extremities: No edema  Neurologic: CN II through XII grossly intact   Psychiatric: Normal mood and affect  Result Review " :                Assessment and Plan   Diagnoses and all orders for this visit:    1. Skin neoplasm (Primary)  -     Ambulatory Referral to Dermatology    2. Upper respiratory tract infection, unspecified type    Other orders  -     pseudoephedrine (Sudafed 12 Hour) 120 MG 12 hr tablet; Take 1 tablet by mouth Every 12 (Twelve) Hours for 10 days.  Dispense: 20 tablet; Refill: 0    I discussed with patient my concern about possible basal cell carcinoma.  It has not changed in size in 2 years.  Features are concerning however so we discussed referral.  I did discuss with patient sending in a prescription for Sudafed to help out with her congestion.  She will continue with Augmentin.         Follow Up   Return if symptoms worsen or fail to improve.  Patient was given instructions and counseling regarding her condition or for health maintenance advice. Please see specific information pulled into the AVS if appropriate.

## 2022-12-15 ENCOUNTER — TELEPHONE (OUTPATIENT)
Dept: FAMILY MEDICINE CLINIC | Facility: CLINIC | Age: 47
End: 2022-12-15

## 2022-12-15 NOTE — TELEPHONE ENCOUNTER
Caller: Maine Olguin    Relationship: Self    Best call back number:[phone]    What is the best time to reach you: ANYTIME     Who are you requesting to speak with (clinical staff, provider,  specific staff member): CLINICAL     What was the call regarding: PATIENT WOULD LIKE A CALLBACK REGARDING HER DERMATOLOGY REFERRAL.     Do you require a callback: YES

## 2023-02-14 ENCOUNTER — PATIENT MESSAGE (OUTPATIENT)
Dept: FAMILY MEDICINE CLINIC | Facility: CLINIC | Age: 48
End: 2023-02-14
Payer: OTHER GOVERNMENT

## 2023-02-14 DIAGNOSIS — Z11.1 SCREENING FOR TUBERCULOSIS: Primary | ICD-10-CM

## 2023-02-28 ENCOUNTER — LAB (OUTPATIENT)
Dept: LAB | Facility: HOSPITAL | Age: 48
End: 2023-02-28
Payer: OTHER GOVERNMENT

## 2023-02-28 DIAGNOSIS — R73.09 ABNORMAL GLUCOSE: ICD-10-CM

## 2023-02-28 DIAGNOSIS — R53.83 OTHER FATIGUE: ICD-10-CM

## 2023-02-28 DIAGNOSIS — Z51.81 MEDICATION MONITORING ENCOUNTER: ICD-10-CM

## 2023-02-28 DIAGNOSIS — D50.9 IRON DEFICIENCY ANEMIA, UNSPECIFIED IRON DEFICIENCY ANEMIA TYPE: ICD-10-CM

## 2023-02-28 DIAGNOSIS — Z11.1 SCREENING FOR TUBERCULOSIS: ICD-10-CM

## 2023-02-28 LAB
ALBUMIN SERPL-MCNC: 4.7 G/DL (ref 3.5–5.2)
ALBUMIN/GLOB SERPL: 1.5 G/DL
ALP SERPL-CCNC: 101 U/L (ref 39–117)
ALT SERPL W P-5'-P-CCNC: 11 U/L (ref 1–33)
ANION GAP SERPL CALCULATED.3IONS-SCNC: 8.9 MMOL/L (ref 5–15)
AST SERPL-CCNC: 19 U/L (ref 1–32)
BASOPHILS # BLD AUTO: 0.07 10*3/MM3 (ref 0–0.2)
BASOPHILS NFR BLD AUTO: 1.1 % (ref 0–1.5)
BILIRUB SERPL-MCNC: 0.4 MG/DL (ref 0–1.2)
BUN SERPL-MCNC: 12 MG/DL (ref 6–20)
BUN/CREAT SERPL: 12.9 (ref 7–25)
CALCIUM SPEC-SCNC: 9.8 MG/DL (ref 8.6–10.5)
CHLORIDE SERPL-SCNC: 103 MMOL/L (ref 98–107)
CO2 SERPL-SCNC: 28.1 MMOL/L (ref 22–29)
CREAT SERPL-MCNC: 0.93 MG/DL (ref 0.57–1)
DEPRECATED RDW RBC AUTO: 39.6 FL (ref 37–54)
EGFRCR SERPLBLD CKD-EPI 2021: 77.4 ML/MIN/1.73
EOSINOPHIL # BLD AUTO: 0.08 10*3/MM3 (ref 0–0.4)
EOSINOPHIL NFR BLD AUTO: 1.2 % (ref 0.3–6.2)
ERYTHROCYTE [DISTWIDTH] IN BLOOD BY AUTOMATED COUNT: 13.2 % (ref 12.3–15.4)
GLOBULIN UR ELPH-MCNC: 3.1 GM/DL
GLUCOSE SERPL-MCNC: 88 MG/DL (ref 65–99)
HBA1C MFR BLD: 5.7 % (ref 4.8–5.6)
HCT VFR BLD AUTO: 35.8 % (ref 34–46.6)
HGB BLD-MCNC: 11.9 G/DL (ref 12–15.9)
IMM GRANULOCYTES # BLD AUTO: 0.01 10*3/MM3 (ref 0–0.05)
IMM GRANULOCYTES NFR BLD AUTO: 0.2 % (ref 0–0.5)
IRON 24H UR-MRATE: 50 MCG/DL (ref 37–145)
IRON SATN MFR SERPL: 14 % (ref 20–50)
LYMPHOCYTES # BLD AUTO: 2.25 10*3/MM3 (ref 0.7–3.1)
LYMPHOCYTES NFR BLD AUTO: 34.4 % (ref 19.6–45.3)
MCH RBC QN AUTO: 27.3 PG (ref 26.6–33)
MCHC RBC AUTO-ENTMCNC: 33.2 G/DL (ref 31.5–35.7)
MCV RBC AUTO: 82.1 FL (ref 79–97)
MONOCYTES # BLD AUTO: 0.43 10*3/MM3 (ref 0.1–0.9)
MONOCYTES NFR BLD AUTO: 6.6 % (ref 5–12)
NEUTROPHILS NFR BLD AUTO: 3.71 10*3/MM3 (ref 1.7–7)
NEUTROPHILS NFR BLD AUTO: 56.5 % (ref 42.7–76)
NRBC BLD AUTO-RTO: 0 /100 WBC (ref 0–0.2)
PLATELET # BLD AUTO: 316 10*3/MM3 (ref 140–450)
PMV BLD AUTO: 11 FL (ref 6–12)
POTASSIUM SERPL-SCNC: 4.2 MMOL/L (ref 3.5–5.2)
PROT SERPL-MCNC: 7.8 G/DL (ref 6–8.5)
RBC # BLD AUTO: 4.36 10*6/MM3 (ref 3.77–5.28)
SODIUM SERPL-SCNC: 140 MMOL/L (ref 136–145)
T4 FREE SERPL-MCNC: 1.07 NG/DL (ref 0.93–1.7)
TIBC SERPL-MCNC: 370 MCG/DL (ref 298–536)
TRANSFERRIN SERPL-MCNC: 248 MG/DL (ref 200–360)
TSH SERPL DL<=0.05 MIU/L-ACNC: 2.16 UIU/ML (ref 0.27–4.2)
WBC NRBC COR # BLD: 6.55 10*3/MM3 (ref 3.4–10.8)

## 2023-02-28 PROCEDURE — 80053 COMPREHEN METABOLIC PANEL: CPT

## 2023-02-28 PROCEDURE — 83036 HEMOGLOBIN GLYCOSYLATED A1C: CPT

## 2023-02-28 PROCEDURE — 36415 COLL VENOUS BLD VENIPUNCTURE: CPT

## 2023-02-28 PROCEDURE — 86480 TB TEST CELL IMMUN MEASURE: CPT

## 2023-02-28 PROCEDURE — 84439 ASSAY OF FREE THYROXINE: CPT

## 2023-02-28 PROCEDURE — 84466 ASSAY OF TRANSFERRIN: CPT

## 2023-02-28 PROCEDURE — 83540 ASSAY OF IRON: CPT

## 2023-02-28 PROCEDURE — 84443 ASSAY THYROID STIM HORMONE: CPT

## 2023-02-28 PROCEDURE — 85025 COMPLETE CBC W/AUTO DIFF WBC: CPT

## 2023-03-02 LAB
GAMMA INTERFERON BACKGROUND BLD IA-ACNC: 0.01 IU/ML
M TB IFN-G BLD-IMP: NEGATIVE
M TB IFN-G CD4+ T-CELLS BLD-ACNC: 0 IU/ML
M TBIFN-G CD4+ CD8+T-CELLS BLD-ACNC: 0.01 IU/ML
MITOGEN IGNF BLD-ACNC: >10 IU/ML
QUANTIFERON INCUBATION: NORMAL
SERVICE CMNT-IMP: NORMAL

## 2023-09-12 ENCOUNTER — TELEPHONE (OUTPATIENT)
Dept: FAMILY MEDICINE CLINIC | Facility: CLINIC | Age: 48
End: 2023-09-12

## 2023-09-12 RX ORDER — BUPROPION HYDROCHLORIDE 300 MG/1
300 TABLET ORAL DAILY
Qty: 30 TABLET | Refills: 0 | Status: SHIPPED | OUTPATIENT
Start: 2023-09-12

## 2023-09-12 RX ORDER — BUSPIRONE HYDROCHLORIDE 15 MG/1
15 TABLET ORAL 2 TIMES DAILY
Qty: 60 TABLET | Refills: 0 | Status: SHIPPED | OUTPATIENT
Start: 2023-09-12

## 2023-09-12 NOTE — TELEPHONE ENCOUNTER
Caller: Maine Olguin    Relationship to patient: Self    Best call back number:     [phone] (Mobile)       Chief complaint: MED REFILLS    Type of visit: MED REFILLS    Requested date: SOMETHING WITHIN THE NEXT 30 DAYS    If rescheduling, when is the original appointment: NA    Additional notes:PATIENT WAS CONTACTED TO SCHEDULE AN APPOINTMENT WITH PCP FOR MEDICATION REFILLS AND NOTHING IS AVAILABLE AHEAD OF 30 DAY SUPPLY SHE WAS GIVEN. OKAY TO LEAVE A MESSAGE ON PHONE

## 2023-11-08 ENCOUNTER — OFFICE VISIT (OUTPATIENT)
Dept: FAMILY MEDICINE CLINIC | Facility: CLINIC | Age: 48
End: 2023-11-08
Payer: OTHER GOVERNMENT

## 2023-11-08 VITALS
HEART RATE: 91 BPM | BODY MASS INDEX: 34.06 KG/M2 | TEMPERATURE: 98.6 F | SYSTOLIC BLOOD PRESSURE: 120 MMHG | HEIGHT: 67 IN | WEIGHT: 217 LBS | DIASTOLIC BLOOD PRESSURE: 80 MMHG | OXYGEN SATURATION: 100 %

## 2023-11-08 DIAGNOSIS — E78.00 PURE HYPERCHOLESTEROLEMIA: ICD-10-CM

## 2023-11-08 DIAGNOSIS — L98.9 SKIN LESION: ICD-10-CM

## 2023-11-08 DIAGNOSIS — Z80.8 FAMILY HISTORY OF MELANOMA: ICD-10-CM

## 2023-11-08 DIAGNOSIS — Z23 NEED FOR INFLUENZA VACCINATION: ICD-10-CM

## 2023-11-08 DIAGNOSIS — F41.9 ANXIETY: ICD-10-CM

## 2023-11-08 DIAGNOSIS — D64.9 ANEMIA, UNSPECIFIED TYPE: ICD-10-CM

## 2023-11-08 DIAGNOSIS — E61.1 IRON DEFICIENCY: ICD-10-CM

## 2023-11-08 DIAGNOSIS — R73.03 PREDIABETES: ICD-10-CM

## 2023-11-08 DIAGNOSIS — R79.89 ABNORMAL THYROID BLOOD TEST: ICD-10-CM

## 2023-11-08 DIAGNOSIS — F32.A DEPRESSION, UNSPECIFIED DEPRESSION TYPE: ICD-10-CM

## 2023-11-08 DIAGNOSIS — J18.9 PNEUMONIA OF LEFT LOWER LOBE DUE TO INFECTIOUS ORGANISM: Primary | ICD-10-CM

## 2023-11-08 RX ORDER — BUPROPION HYDROCHLORIDE 300 MG/1
300 TABLET ORAL DAILY
Qty: 90 TABLET | Refills: 3 | Status: SHIPPED | OUTPATIENT
Start: 2023-11-08

## 2023-11-08 NOTE — PROGRESS NOTES
"Chief Complaint  Follow up for depression  Anxiety  Pneumonia  Skin cancer    Subjective        Maine Olguin presents to [facility]  History of Present Illness  Patient presents today to follow-up for depression and anxiety.  Her symptoms have been under good control taking Wellbutrin 300 mg daily.  She is no longer using BuSpar.  She still has it available just in case but overall her anxiety has been doing better.  She is interested in being checked for the MTHFR mutation.  Her most recent TSH and free T4 levels have been normal.  She previously did have a slightly low free T4 level.  We discussed continuing to monitor at this time with plan to have labs repeated in 3 months.  Her A1c was most recently 5.7%.  We did discuss prediabetes.  We discussed working on lifestyle changes including diet and exercise.  She was seen by dermatology for the skin lesion on her right shoulder.  She reports that this ended up being basal cell cancer.  She does have a bump in this area after this skin cancer was removed with a punch biopsy.  She reports that the margins were clear.  She was seen at [facility] in dermatology.  Her most recent hemoglobin was 11.9.  Iron level was normal with a slightly low iron saturation of 14%.  She is not currently taking iron supplementation but we did discuss monitoring at this time.  She also follows up for pneumonia.  She had a chest x-ray done on 9/24/2023 showing a possible left lower lobe pneumonia.  Patient was treated with azithromycin and cefdinir.  She reports feeling better except for slight wheeze that she notices in the center of the chest.  She denies any shortness of breath.  She has previously had pneumonia.  It is noted that her father is dealing with a second diagnosis of melanoma.  Objective   Vital Signs:  /80 (BP Location: Left arm, Patient Position: Sitting)   Pulse 91   Temp 98.6 °F (37 °C) (Oral)   Ht 170.2 cm (67\")   Wt 98.4 " "kg (217 lb)   SpO2 100%   BMI 33.99 kg/m²   Estimated body mass index is 33.99 kg/m² as calculated from the following:    Height as of this encounter: 170.2 cm (67\").    Weight as of this encounter: 98.4 kg (217 lb).       BMI is >= 30 and <35. (Class 1 Obesity). The following options were offered after discussion;: exercise counseling/recommendations and nutrition counseling/recommendations      Physical Exam  Vitals reviewed.   Constitutional:       Appearance: Normal appearance.   HENT:      Head: Normocephalic and atraumatic.      Mouth/Throat:      Pharynx: No oropharyngeal exudate.   Eyes:      Conjunctiva/sclera: Conjunctivae normal.   Cardiovascular:      Rate and Rhythm: Normal rate and regular rhythm.      Heart sounds: No murmur heard.     No friction rub. No gallop.   Pulmonary:      Effort: Pulmonary effort is normal.      Breath sounds: No wheezing or rhonchi.      Comments: There is slight stridor noted with inspiration.  Abdominal:      General: Bowel sounds are normal. There is no distension.      Palpations: Abdomen is soft.      Tenderness: There is no abdominal tenderness.   Skin:     Comments: In the area where she had her skin cancer there is a raised slightly erythematous lesion.  The measurements are 9 x 8 mm.  The differential would include any recurrence of basal cell cancer as well as a dermatofibroma.  I do favor the latter.   Neurological:      Mental Status: She is alert and oriented to person, place, and time.      Cranial Nerves: No cranial nerve deficit.   Psychiatric:         Mood and Affect: Mood and affect normal.         Behavior: Behavior normal.         Thought Content: Thought content normal.         Judgment: Judgment normal.        Result Review :                   Assessment and Plan   Diagnoses and all orders for this visit:    1. Pneumonia of left lower lobe due to infectious organism (Primary)  -     XR Chest PA & Lateral; Future    2. Skin lesion  -     Ambulatory " Referral to Dermatology    3. Anxiety    4. Depression, unspecified depression type  -     MTHFR Mutation; Future    5. Need for influenza vaccination  -     Fluzone >6 Months ([id number])    6. Prediabetes  -     CBC & Differential; Future  -     Hemoglobin A1c; Future    7. Anemia, unspecified type  -     CBC & Differential; Future  -     Comprehensive Metabolic Panel; Future    8. Pure hypercholesterolemia  -     Lipid Panel; Future    9. Abnormal thyroid blood test  -     TSH+Free T4; Future    10. Family history of melanoma, father    11. Iron deficiency  -     Iron Profile; Future    Other orders  -     buPROPion XL (Wellbutrin XL) 300 MG 24 hr tablet; Take 1 tablet by mouth Daily.  Dispense: 90 tablet; Refill: 3      I suspect a dermatofibroma on her right shoulder however given her recent basal cell carcinoma I am concerned about any possible recurrence.  I have asked for her to be seen by dermatology again.  We did discuss having labs repeated in 3 months.  As far as the pneumonia is concerned she has clinically resolved.  She does have a slight stridor noted.  I did discuss with patient options including monitoring at this time which she is agreeable to do so.  Should symptoms persist we did discuss further evaluation including a chest CT as well as a PFT.  I did discuss with patient having a follow-up chest x-ray completed to ensure resolution.  She is encouraged to get this chest x-ray done at her convenience.  We discussed continuing current management for anxiety and depression.       Follow Up   Return in about 3 months (around 2/8/2024) for depression.  Patient was given instructions and counseling regarding her condition or for health maintenance advice. Please see specific information pulled into the AVS if appropriate.

## 2023-11-17 ENCOUNTER — HOSPITAL ENCOUNTER (OUTPATIENT)
Dept: GENERAL RADIOLOGY | Facility: HOSPITAL | Age: 48
Discharge: HOME | End: 2023-11-17
Admitting: FAMILY MEDICINE
Payer: OTHER GOVERNMENT

## 2023-11-17 DIAGNOSIS — J18.9 PNEUMONIA OF LEFT LOWER LOBE DUE TO INFECTIOUS ORGANISM: ICD-10-CM

## 2023-11-17 PROCEDURE — 71046 X-RAY EXAM CHEST 2 VIEWS: CPT

## 2023-11-27 DIAGNOSIS — J06.9 UPPER RESPIRATORY TRACT INFECTION, UNSPECIFIED TYPE: Primary | ICD-10-CM

## 2023-11-28 ENCOUNTER — PATIENT MESSAGE (OUTPATIENT)
Dept: FAMILY MEDICINE CLINIC | Facility: CLINIC | Age: 48
End: 2023-11-28
Payer: OTHER GOVERNMENT

## 2023-11-29 DIAGNOSIS — Z13.39 ADHD (ATTENTION DEFICIT HYPERACTIVITY DISORDER) EVALUATION: Primary | ICD-10-CM

## 2023-11-29 NOTE — TELEPHONE ENCOUNTER
Please contact patient and find out where she would like to go to be evaluated for ADHD.  I am okay with you placing this referral.

## 2023-12-27 ENCOUNTER — TELEPHONE (OUTPATIENT)
Dept: FAMILY MEDICINE CLINIC | Facility: CLINIC | Age: 48
End: 2023-12-27
Payer: OTHER GOVERNMENT

## 2023-12-27 NOTE — TELEPHONE ENCOUNTER
Caller:      Relationship:REFERRAL      Best call back number: [phone]     Who are you requesting to speak with (clinical staff, provider,  specific staff member): CLINICAL     What was the call regarding:   THE REFERRAL PSYCHOLOGY REFERRAL PEOPLE ARE ASKING FOR THE REFERRAL TO BE REWORKED TO INCLUDE PSYCHOLOGICAL TESTING  CPT CODES  77157 I UNIT  94416 7 UNIT  44165 1 UNIT  89860 11 UNIT    PLEASE ADVISE

## 2023-12-28 DIAGNOSIS — Z13.39 ADHD (ATTENTION DEFICIT HYPERACTIVITY DISORDER) EVALUATION: Primary | ICD-10-CM

## 2023-12-28 NOTE — TELEPHONE ENCOUNTER
Phone call placed to [facility] with request for a [facility] referral for psychological testing and sent to the provider for approval.

## 2024-01-16 ENCOUNTER — TRANSCRIBE ORDERS (OUTPATIENT)
Dept: FAMILY MEDICINE CLINIC | Facility: CLINIC | Age: 49
End: 2024-01-16
Payer: OTHER GOVERNMENT

## 2024-01-16 DIAGNOSIS — Z12.31 SCREENING MAMMOGRAM FOR BREAST CANCER: Primary | ICD-10-CM

## 2024-01-30 ENCOUNTER — OFFICE VISIT (OUTPATIENT)
Dept: PULMONOLOGY | Facility: CLINIC | Age: 49
End: 2024-01-30
Payer: OTHER GOVERNMENT

## 2024-01-30 VITALS
HEIGHT: 67 IN | SYSTOLIC BLOOD PRESSURE: 126 MMHG | BODY MASS INDEX: 34 KG/M2 | RESPIRATION RATE: 16 BRPM | WEIGHT: 216.6 LBS | DIASTOLIC BLOOD PRESSURE: 86 MMHG | HEART RATE: 71 BPM | OXYGEN SATURATION: 96 % | TEMPERATURE: 97.7 F

## 2024-01-30 DIAGNOSIS — J45.41 MODERATE PERSISTENT ASTHMATIC BRONCHITIS WITH ACUTE EXACERBATION: Primary | ICD-10-CM

## 2024-01-30 DIAGNOSIS — J69.0 ASPIRATION PNEUMONIA OF LEFT LOWER LOBE, UNSPECIFIED ASPIRATION PNEUMONIA TYPE: ICD-10-CM

## 2024-01-30 PROCEDURE — 99203 OFFICE O/P NEW LOW 30 MIN: CPT | Performed by: INTERNAL MEDICINE

## 2024-01-30 RX ORDER — ALBUTEROL SULFATE 90 UG/1
2 AEROSOL, METERED RESPIRATORY (INHALATION)
COMMUNITY
Start: 2023-09-24

## 2024-01-30 RX ORDER — IPRATROPIUM BROMIDE AND ALBUTEROL SULFATE 2.5; .5 MG/3ML; MG/3ML
3 SOLUTION RESPIRATORY (INHALATION)
COMMUNITY
Start: 2023-12-20

## 2024-01-30 RX ORDER — AMOXICILLIN AND CLAVULANATE POTASSIUM 875; 125 MG/1; MG/1
1 TABLET, FILM COATED ORAL 2 TIMES DAILY
Qty: 14 TABLET | Refills: 0 | Status: SHIPPED | OUTPATIENT
Start: 2024-01-30 | End: 2024-02-06

## 2024-01-30 RX ORDER — PREDNISONE 10 MG/1
TABLET ORAL
Qty: 31 TABLET | Refills: 0 | Status: SHIPPED | OUTPATIENT
Start: 2024-01-30

## 2024-01-30 NOTE — PROGRESS NOTES
Pulmonary Consultation    Tanmay Mayberry DO,    Thank you for asking me to see Maine Olguin for   Chief Complaint   Patient presents with    Establish Care    Bronchitis    Pneumonia    Shortness of Breath    Cough    Wheezing    URI   .      History of Present Illness  Maine Olguin is a 48 y.o. female with a PMH significant for pneumonia in December which was treated with antibiotics presents with continued cough with wheezing and shortness of breath patient complains of chest congestion with mucopurulent expectoration she denies any fever or chills at this time patient is taking albuterol inhaler along with neb treatments as needed patient does not have any history of COPD or bronchial asthma      Tobacco use history:  Never smoker      Review of Systems: History obtained from chart review and the patient.  Review of Systems   Respiratory:  Positive for cough, shortness of breath and wheezing.    All other systems reviewed and are negative.    As described in the HPI. Otherwise, remainder of ROS (14 systems) were negative.    Patient Active Problem List   Diagnosis    Family history of melanoma, father    Pure hypercholesterolemia    Anemia    Prediabetes    Depression    Anxiety         Current Outpatient Medications:     albuterol sulfate  (90 Base) MCG/ACT inhaler, Inhale 2 puffs., Disp: , Rfl:     buPROPion XL (Wellbutrin XL) 300 MG 24 hr tablet, Take 1 tablet by mouth Daily., Disp: 90 tablet, Rfl: 3    ipratropium-albuterol (DUO-NEB) 0.5-2.5 mg/3 ml nebulizer, Inhale 3 mL., Disp: , Rfl:     ALBUTEROL IN, , Disp: , Rfl:     amoxicillin-clavulanate (AUGMENTIN) 875-125 MG per tablet, Take 1 tablet by mouth 2 (Two) Times a Day for 7 days., Disp: 14 tablet, Rfl: 0    predniSONE (DELTASONE) 10 MG tablet, Take 4 tabs daily x 3 days, then take 3 tabs daily x 3 days, then take 2 tabs daily x 3 days, then take 1 tab daily x 3 days, Disp: 31 tablet, Rfl: 0    No Known Allergies    Past Medical  "History:   Diagnosis Date    Anxiety 2017     Past Surgical History:   Procedure Laterality Date     SECTION  2017    HYSTERECTOMY      TONSILLECTOMY  1997     Social History     Socioeconomic History    Marital status:    Tobacco Use    Smoking status: Never    Smokeless tobacco: Never   Vaping Use    Vaping Use: Never used   Substance and Sexual Activity    Alcohol use: Not Currently     Comment: Once every 1-2 months; rare    Drug use: Never    Sexual activity: Yes     Partners: Male     Birth control/protection: Surgical     Family History   Problem Relation Age of Onset    Depression Mother     Diabetes Mother     Stroke Mother     Vision loss Mother     Other Mother         MS    Cancer Father     Hyperlipidemia Father     Depression Maternal Grandmother     Diabetes Maternal Grandmother     Stroke Maternal Grandmother     Alcohol abuse Paternal Grandfather     Cancer Paternal Grandmother        XR Chest PA & Lateral    Result Date: 2023   Left basilar pneumonia appears improved in the interval.     DANIELLE BELLO MD       Electronically Signed and Approved By: DANIELLE BELLO MD on 2023 at 16:01                  Objective     Blood pressure 126/86, pulse 71, temperature 97.7 °F (36.5 °C), temperature source Tympanic, resp. rate 16, height 170.2 cm (67\"), weight 98.2 kg (216 lb 9.6 oz), SpO2 96%, not currently breastfeeding.  Physical Exam  Vitals and nursing note reviewed.   Constitutional:       Appearance: She is ill-appearing.   HENT:      Head: Normocephalic and atraumatic.      Nose: Congestion present.      Mouth/Throat:      Mouth: Mucous membranes are moist.      Pharynx: Oropharynx is clear.   Eyes:      Extraocular Movements: Extraocular movements intact.      Conjunctiva/sclera: Conjunctivae normal.      Pupils: Pupils are equal, round, and reactive to light.   Cardiovascular:      Rate and Rhythm: Normal rate and regular rhythm.      Pulses: Normal pulses.      Heart " sounds: Normal heart sounds.   Pulmonary:      Effort: Pulmonary effort is normal.      Breath sounds: Wheezing and rhonchi present.   Abdominal:      General: Abdomen is flat. Bowel sounds are normal.      Palpations: Abdomen is soft.   Musculoskeletal:         General: Normal range of motion.      Cervical back: Normal range of motion and neck supple.   Skin:     General: Skin is warm.      Capillary Refill: Capillary refill takes 2 to 3 seconds.   Neurological:      General: No focal deficit present.      Mental Status: She is alert and oriented to person, place, and time.   Psychiatric:         Mood and Affect: Mood normal.         Behavior: Behavior normal.       Immunization History   Administered Date(s) Administered    COVID-19 (MODERNA) 1st,2nd,3rd Dose Monovalent 01/06/2021, 02/02/2021    Fluzone (or Fluarix & Flulaval for VFC) >6mos 10/27/2022, 11/08/2023    Influenza Injectable Mdck Pf Quad 11/13/2020    MMR 08/30/1978, 01/10/1996    Tdap 08/14/2020            Assessment & Plan     Diagnoses and all orders for this visit:    1. Moderate persistent asthmatic bronchitis with acute exacerbation (Primary)  -     CT Chest With Contrast; Future  -     Complete PFT - Pre & Post Bronchodilator; Future    2. Aspiration pneumonia of left lower lobe, unspecified aspiration pneumonia type  -     CT Chest With Contrast; Future  -     Complete PFT - Pre & Post Bronchodilator; Future    Other orders  -     amoxicillin-clavulanate (AUGMENTIN) 875-125 MG per tablet; Take 1 tablet by mouth 2 (Two) Times a Day for 7 days.  Dispense: 14 tablet; Refill: 0  -     predniSONE (DELTASONE) 10 MG tablet; Take 4 tabs daily x 3 days, then take 3 tabs daily x 3 days, then take 2 tabs daily x 3 days, then take 1 tab daily x 3 days  Dispense: 31 tablet; Refill: 0         Result Review :       Data reviewed : Radiologic studies chest      Discussion/ Recommendations:   We will order CT chest for further evaluation  Will order  PFT  Continue albuterol inhaler and neb treatments  Will start her on Augmentin for 1 week  Prednisone tapering dose  Discussed vaccination and recommended                Return in about 2 weeks (around 2/13/2024).      Thank you for allowing me to participate in the care of Maine Olguin. Please do not hesitate to contact me with any questions.         This document has been electronically signed by Tej Lou MD on January 30, 2024 08:35 EST

## 2024-02-06 ENCOUNTER — LAB (OUTPATIENT)
Dept: LAB | Facility: HOSPITAL | Age: 49
End: 2024-02-06
Payer: OTHER GOVERNMENT

## 2024-02-06 DIAGNOSIS — F32.A DEPRESSION, UNSPECIFIED DEPRESSION TYPE: ICD-10-CM

## 2024-02-06 DIAGNOSIS — E61.1 IRON DEFICIENCY: ICD-10-CM

## 2024-02-06 DIAGNOSIS — R73.03 PREDIABETES: ICD-10-CM

## 2024-02-06 DIAGNOSIS — E78.00 PURE HYPERCHOLESTEROLEMIA: ICD-10-CM

## 2024-02-06 DIAGNOSIS — D64.9 ANEMIA, UNSPECIFIED TYPE: ICD-10-CM

## 2024-02-06 DIAGNOSIS — R79.89 ABNORMAL THYROID BLOOD TEST: ICD-10-CM

## 2024-02-06 LAB
ALBUMIN SERPL-MCNC: 4.4 G/DL (ref 3.5–5.2)
ALBUMIN/GLOB SERPL: 1.6 G/DL
ALP SERPL-CCNC: 89 U/L (ref 39–117)
ALT SERPL W P-5'-P-CCNC: 11 U/L (ref 1–33)
ANION GAP SERPL CALCULATED.3IONS-SCNC: 9.9 MMOL/L (ref 5–15)
AST SERPL-CCNC: 13 U/L (ref 1–32)
BASOPHILS # BLD AUTO: 0.1 10*3/MM3 (ref 0–0.2)
BASOPHILS NFR BLD AUTO: 0.9 % (ref 0–1.5)
BILIRUB SERPL-MCNC: 0.5 MG/DL (ref 0–1.2)
BUN SERPL-MCNC: 14 MG/DL (ref 6–20)
BUN/CREAT SERPL: 14.4 (ref 7–25)
CALCIUM SPEC-SCNC: 9.2 MG/DL (ref 8.6–10.5)
CHLORIDE SERPL-SCNC: 102 MMOL/L (ref 98–107)
CHOLEST SERPL-MCNC: 173 MG/DL (ref 0–200)
CO2 SERPL-SCNC: 28.1 MMOL/L (ref 22–29)
CREAT SERPL-MCNC: 0.97 MG/DL (ref 0.57–1)
DEPRECATED RDW RBC AUTO: 39.4 FL (ref 37–54)
EGFRCR SERPLBLD CKD-EPI 2021: 73.1 ML/MIN/1.73
EOSINOPHIL # BLD AUTO: 0.11 10*3/MM3 (ref 0–0.4)
EOSINOPHIL NFR BLD AUTO: 1 % (ref 0.3–6.2)
ERYTHROCYTE [DISTWIDTH] IN BLOOD BY AUTOMATED COUNT: 12.8 % (ref 12.3–15.4)
GLOBULIN UR ELPH-MCNC: 2.8 GM/DL
GLUCOSE SERPL-MCNC: 80 MG/DL (ref 65–99)
HBA1C MFR BLD: 6.2 % (ref 4.8–5.6)
HCT VFR BLD AUTO: 37.2 % (ref 34–46.6)
HDLC SERPL-MCNC: 60 MG/DL (ref 40–60)
HGB BLD-MCNC: 12.1 G/DL (ref 12–15.9)
IMM GRANULOCYTES # BLD AUTO: 0.05 10*3/MM3 (ref 0–0.05)
IMM GRANULOCYTES NFR BLD AUTO: 0.4 % (ref 0–0.5)
IRON 24H UR-MRATE: 98 MCG/DL (ref 37–145)
IRON SATN MFR SERPL: 28 % (ref 20–50)
LDLC SERPL CALC-MCNC: 89 MG/DL (ref 0–100)
LDLC/HDLC SERPL: 1.42 {RATIO}
LYMPHOCYTES # BLD AUTO: 4.45 10*3/MM3 (ref 0.7–3.1)
LYMPHOCYTES NFR BLD AUTO: 38.9 % (ref 19.6–45.3)
MCH RBC QN AUTO: 27.5 PG (ref 26.6–33)
MCHC RBC AUTO-ENTMCNC: 32.5 G/DL (ref 31.5–35.7)
MCV RBC AUTO: 84.5 FL (ref 79–97)
MONOCYTES # BLD AUTO: 0.84 10*3/MM3 (ref 0.1–0.9)
MONOCYTES NFR BLD AUTO: 7.3 % (ref 5–12)
NEUTROPHILS NFR BLD AUTO: 5.9 10*3/MM3 (ref 1.7–7)
NEUTROPHILS NFR BLD AUTO: 51.5 % (ref 42.7–76)
NRBC BLD AUTO-RTO: 0 /100 WBC (ref 0–0.2)
PLATELET # BLD AUTO: 295 10*3/MM3 (ref 140–450)
PMV BLD AUTO: 10.4 FL (ref 6–12)
POTASSIUM SERPL-SCNC: 3.4 MMOL/L (ref 3.5–5.2)
PROT SERPL-MCNC: 7.2 G/DL (ref 6–8.5)
RBC # BLD AUTO: 4.4 10*6/MM3 (ref 3.77–5.28)
SODIUM SERPL-SCNC: 140 MMOL/L (ref 136–145)
T4 FREE SERPL-MCNC: 1.2 NG/DL (ref 0.93–1.7)
TIBC SERPL-MCNC: 346 MCG/DL (ref 298–536)
TRANSFERRIN SERPL-MCNC: 232 MG/DL (ref 200–360)
TRIGL SERPL-MCNC: 138 MG/DL (ref 0–150)
TSH SERPL DL<=0.05 MIU/L-ACNC: 3.41 UIU/ML (ref 0.27–4.2)
VLDLC SERPL-MCNC: 24 MG/DL (ref 5–40)
WBC NRBC COR # BLD AUTO: 11.45 10*3/MM3 (ref 3.4–10.8)

## 2024-02-06 PROCEDURE — 83540 ASSAY OF IRON: CPT

## 2024-02-06 PROCEDURE — 84439 ASSAY OF FREE THYROXINE: CPT

## 2024-02-06 PROCEDURE — 83036 HEMOGLOBIN GLYCOSYLATED A1C: CPT

## 2024-02-06 PROCEDURE — 84466 ASSAY OF TRANSFERRIN: CPT

## 2024-02-06 PROCEDURE — 81291 MTHFR GENE: CPT

## 2024-02-06 PROCEDURE — 80061 LIPID PANEL: CPT

## 2024-02-06 PROCEDURE — 85025 COMPLETE CBC W/AUTO DIFF WBC: CPT

## 2024-02-06 PROCEDURE — 80053 COMPREHEN METABOLIC PANEL: CPT

## 2024-02-06 PROCEDURE — 84443 ASSAY THYROID STIM HORMONE: CPT

## 2024-02-08 ENCOUNTER — OFFICE VISIT (OUTPATIENT)
Dept: FAMILY MEDICINE CLINIC | Facility: CLINIC | Age: 49
End: 2024-02-08
Payer: OTHER GOVERNMENT

## 2024-02-08 VITALS
DIASTOLIC BLOOD PRESSURE: 88 MMHG | RESPIRATION RATE: 19 BRPM | OXYGEN SATURATION: 100 % | HEART RATE: 59 BPM | WEIGHT: 212.8 LBS | TEMPERATURE: 97.9 F | BODY MASS INDEX: 33.4 KG/M2 | SYSTOLIC BLOOD PRESSURE: 120 MMHG | HEIGHT: 67 IN

## 2024-02-08 DIAGNOSIS — B99.9 RECURRENT INFECTIONS: ICD-10-CM

## 2024-02-08 DIAGNOSIS — E78.00 PURE HYPERCHOLESTEROLEMIA: ICD-10-CM

## 2024-02-08 DIAGNOSIS — F41.9 ANXIETY: ICD-10-CM

## 2024-02-08 DIAGNOSIS — R06.2 WHEEZING: ICD-10-CM

## 2024-02-08 DIAGNOSIS — J18.9 PNEUMONIA OF LEFT LOWER LOBE DUE TO INFECTIOUS ORGANISM: ICD-10-CM

## 2024-02-08 DIAGNOSIS — Z23 NEED FOR PNEUMOCOCCAL VACCINATION: ICD-10-CM

## 2024-02-08 DIAGNOSIS — E55.9 VITAMIN D DEFICIENCY: ICD-10-CM

## 2024-02-08 DIAGNOSIS — R73.03 PREDIABETES: Primary | ICD-10-CM

## 2024-02-08 DIAGNOSIS — E53.8 B12 DEFICIENCY: ICD-10-CM

## 2024-02-08 DIAGNOSIS — E53.8 FOLATE DEFICIENCY: ICD-10-CM

## 2024-02-08 DIAGNOSIS — F32.A DEPRESSION, UNSPECIFIED DEPRESSION TYPE: ICD-10-CM

## 2024-02-08 PROCEDURE — 99214 OFFICE O/P EST MOD 30 MIN: CPT | Performed by: FAMILY MEDICINE

## 2024-02-08 RX ORDER — BUPROPION HYDROCHLORIDE 300 MG/1
300 TABLET ORAL DAILY
Qty: 90 TABLET | Refills: 3 | Status: SHIPPED | OUTPATIENT
Start: 2024-02-08

## 2024-02-08 NOTE — PROGRESS NOTES
Chief Complaint  Prediabetes      Subjective        Maine Olguin presents to [facility]  History of Present Illness  Patient presents today to follow-up for prediabetes.  She had labs done prior to the appointment so we reviewed these.  Her iron levels were normal as well as thyroid and her lipid panel was normal.  A1c was increased at 6.2%.  She is in the prediabetic range.  We did discuss work on lifestyle changes including diet and exercise.  She is down 4 pounds.  CMP did show slightly low potassium level.  We did discuss monitoring at this time and increasing dietary intake of potassium.  Her white blood cell count was slightly elevated which I suspect is due to her being on prednisone at this time as she is being treated by pulmonology for asthmatic bronchitis/pneumonia with Augmentin and prednisone.  She has finished her Augmentin now and will have follow-up again next week with Dr. Lou.  She has a CT of the chest coming up.  We did discuss the issues with recurrent infection and I did discuss with her considering pneumococcal 20 vaccination which she can get done after she has completed her prednisone as well as depending on the results of her chest CT.  She will also have a PFT done to further evaluate.  She does have an inhaler which helps out some.  She previously had pneumonia back in September.  On follow-up chest x-ray was done in November that showed improvement of left basilar pneumonia.  Chest x-ray in December when she was diagnosed with bronchitis showed no acute abnormality.  This is per report.  She continues to take Wellbutrin for depression and anxiety which has been beneficial.  We did discuss continuing current management.  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  Objective   Vital Signs:  /88 (BP Location: Right arm, Patient Position: Sitting, Cuff Size: Adult)   Pulse 59    "Temp 97.9 °F (36.6 °C) (Oral)   Resp 19   Ht 170.2 cm (67\")   Wt 96.5 kg (212 lb 12.8 oz)   SpO2 100%   BMI 33.33 kg/m²   Estimated body mass index is 33.33 kg/m² as calculated from the following:    Height as of this encounter: 170.2 cm (67\").    Weight as of this encounter: 96.5 kg (212 lb 12.8 oz).               Physical Exam  Vitals reviewed.   Constitutional:       Appearance: Normal appearance.   HENT:      Head: Normocephalic and atraumatic.      Right Ear: External ear normal.      Left Ear: External ear normal.      Nose: Nose normal.   Eyes:      Conjunctiva/sclera: Conjunctivae normal.   Cardiovascular:      Rate and Rhythm: Normal rate and regular rhythm.      Heart sounds: No murmur heard.     No friction rub. No gallop.   Pulmonary:      Effort: Pulmonary effort is normal.      Breath sounds: Normal breath sounds. No wheezing or rhonchi.   Abdominal:      General: Bowel sounds are normal. There is no distension.      Palpations: Abdomen is soft.      Tenderness: There is no abdominal tenderness.   Skin:     General: Skin is warm and dry.   Neurological:      Mental Status: She is alert and oriented to person, place, and time.      Cranial Nerves: No cranial nerve deficit.   Psychiatric:         Mood and Affect: Mood and affect normal.         Behavior: Behavior normal.         Thought Content: Thought content normal.         Judgment: Judgment normal.        Result Review :                     Assessment and Plan     Diagnoses and all orders for this visit:    1. Prediabetes (Primary)  -     CBC & Differential; Future  -     Comprehensive Metabolic Panel; Future  -     Hemoglobin A1c; Future    2. Pneumonia of left lower lobe due to infectious organism  -     Pneumococcal Antibody (23 Serotype); Future    3. Wheezing    4. Anxiety    5. Depression, unspecified depression type    6. Pure hypercholesterolemia  -     Lipid Panel; Future    7. Need for pneumococcal vaccination    8. Vitamin D " deficiency  -     Vitamin D,25-Hydroxy; Future    9. B12 deficiency  -     Vitamin B12; Future    10. Folate deficiency  -     Folate; Future    11. Recurrent infections  -     Pneumococcal Antibody (23 Serotype); Future    Other orders  -     buPROPion XL (Wellbutrin XL) 300 MG 24 hr tablet; Take 1 tablet by mouth Daily.  Dispense: 90 tablet; Refill: 3    Plan as documented above.  I discussed with patient seeing her back in 3 months with labs being repeated prior to next appointment.  We discussed continuing current management for anxiety and depression.  Plan to check vitamin D, B12 and folate levels with her next lab work.  I did discuss also checking pneumococcal antibodies with her future lab work.         Follow Up     Return in about 3 months (around 5/8/2024) for prediabetes.  Patient was given instructions and counseling regarding her condition or for health maintenance advice. Please see specific information pulled into the AVS if appropriate.

## 2024-02-09 ENCOUNTER — HOSPITAL ENCOUNTER (OUTPATIENT)
Dept: CT IMAGING | Facility: HOSPITAL | Age: 49
Discharge: HOME | End: 2024-02-09
Admitting: INTERNAL MEDICINE
Payer: OTHER GOVERNMENT

## 2024-02-09 DIAGNOSIS — J69.0 ASPIRATION PNEUMONIA OF LEFT LOWER LOBE, UNSPECIFIED ASPIRATION PNEUMONIA TYPE: ICD-10-CM

## 2024-02-09 DIAGNOSIS — J45.41 MODERATE PERSISTENT ASTHMATIC BRONCHITIS WITH ACUTE EXACERBATION: ICD-10-CM

## 2024-02-09 PROCEDURE — 25510000001 IOPAMIDOL PER 1 ML: Performed by: INTERNAL MEDICINE

## 2024-02-09 PROCEDURE — 71260 CT THORAX DX C+: CPT

## 2024-02-09 RX ADMIN — IOPAMIDOL 100 ML: 755 INJECTION, SOLUTION INTRAVENOUS at 15:52

## 2024-02-13 ENCOUNTER — OFFICE VISIT (OUTPATIENT)
Dept: PULMONOLOGY | Facility: CLINIC | Age: 49
End: 2024-02-13
Payer: OTHER GOVERNMENT

## 2024-02-13 VITALS
RESPIRATION RATE: 16 BRPM | SYSTOLIC BLOOD PRESSURE: 134 MMHG | BODY MASS INDEX: 33.59 KG/M2 | HEIGHT: 67 IN | OXYGEN SATURATION: 98 % | HEART RATE: 77 BPM | DIASTOLIC BLOOD PRESSURE: 84 MMHG | WEIGHT: 214 LBS

## 2024-02-13 DIAGNOSIS — J45.41 MODERATE PERSISTENT ASTHMATIC BRONCHITIS WITH ACUTE EXACERBATION: Primary | ICD-10-CM

## 2024-02-13 PROCEDURE — 99214 OFFICE O/P EST MOD 30 MIN: CPT | Performed by: INTERNAL MEDICINE

## 2024-02-13 RX ORDER — BUDESONIDE AND FORMOTEROL FUMARATE DIHYDRATE 160; 4.5 UG/1; UG/1
2 AEROSOL RESPIRATORY (INHALATION) 2 TIMES DAILY
Qty: 1 EACH | Refills: 3 | Status: SHIPPED | OUTPATIENT
Start: 2024-02-13 | End: 2024-02-14

## 2024-02-13 RX ORDER — ALBUTEROL SULFATE 90 UG/1
2 AEROSOL, METERED RESPIRATORY (INHALATION) EVERY 4 HOURS PRN
Qty: 18 G | Refills: 5 | Status: SHIPPED | OUTPATIENT
Start: 2024-02-13 | End: 2024-02-13 | Stop reason: SDUPTHER

## 2024-02-13 RX ORDER — ALBUTEROL SULFATE 90 UG/1
2 AEROSOL, METERED RESPIRATORY (INHALATION) EVERY 4 HOURS PRN
Qty: 18 G | Refills: 5 | Status: SHIPPED | OUTPATIENT
Start: 2024-02-13

## 2024-02-14 RX ORDER — FLUTICASONE PROPIONATE AND SALMETEROL 250; 50 UG/1; UG/1
1 POWDER RESPIRATORY (INHALATION)
Qty: 60 EACH | Refills: 11 | Status: SHIPPED | OUTPATIENT
Start: 2024-02-14

## 2024-02-19 LAB
IMP & REVIEW OF LAB RESULTS: NORMAL
MTHFR GENE MUT ANL BLD/T: NORMAL

## 2024-02-26 ENCOUNTER — HOSPITAL ENCOUNTER (OUTPATIENT)
Dept: MAMMOGRAPHY | Facility: HOSPITAL | Age: 49
Discharge: HOME | End: 2024-02-26
Admitting: FAMILY MEDICINE
Payer: OTHER GOVERNMENT

## 2024-02-26 DIAGNOSIS — Z12.31 SCREENING MAMMOGRAM FOR BREAST CANCER: ICD-10-CM

## 2024-02-26 PROCEDURE — 77063 BREAST TOMOSYNTHESIS BI: CPT

## 2024-02-26 PROCEDURE — 77067 SCR MAMMO BI INCL CAD: CPT

## 2024-02-27 ENCOUNTER — HOSPITAL ENCOUNTER (OUTPATIENT)
Dept: RESPIRATORY THERAPY | Facility: HOSPITAL | Age: 49
Discharge: HOME | End: 2024-02-27
Admitting: INTERNAL MEDICINE
Payer: OTHER GOVERNMENT

## 2024-02-27 DIAGNOSIS — J69.0 ASPIRATION PNEUMONIA OF LEFT LOWER LOBE, UNSPECIFIED ASPIRATION PNEUMONIA TYPE: ICD-10-CM

## 2024-02-27 DIAGNOSIS — J45.41 MODERATE PERSISTENT ASTHMATIC BRONCHITIS WITH ACUTE EXACERBATION: ICD-10-CM

## 2024-02-27 PROCEDURE — 94729 DIFFUSING CAPACITY: CPT

## 2024-02-27 PROCEDURE — 94726 PLETHYSMOGRAPHY LUNG VOLUMES: CPT

## 2024-02-27 PROCEDURE — 94060 EVALUATION OF WHEEZING: CPT

## 2024-02-27 RX ORDER — ALBUTEROL SULFATE 2.5 MG/3ML
2.5 SOLUTION RESPIRATORY (INHALATION) ONCE
Status: COMPLETED | OUTPATIENT
Start: 2024-02-27 | End: 2024-02-27

## 2024-02-27 RX ADMIN — ALBUTEROL SULFATE 2.5 MG: 2.5 SOLUTION RESPIRATORY (INHALATION) at 11:30

## 2024-04-01 ENCOUNTER — OFFICE VISIT (OUTPATIENT)
Dept: PULMONOLOGY | Facility: CLINIC | Age: 49
End: 2024-04-01
Payer: OTHER GOVERNMENT

## 2024-04-01 VITALS
HEART RATE: 68 BPM | BODY MASS INDEX: 35 KG/M2 | TEMPERATURE: 98.4 F | OXYGEN SATURATION: 97 % | DIASTOLIC BLOOD PRESSURE: 79 MMHG | WEIGHT: 223 LBS | RESPIRATION RATE: 14 BRPM | HEIGHT: 67 IN | SYSTOLIC BLOOD PRESSURE: 112 MMHG

## 2024-04-01 DIAGNOSIS — J45.20 MILD INTERMITTENT ASTHMA, UNSPECIFIED WHETHER COMPLICATED: Primary | ICD-10-CM

## 2024-04-01 PROCEDURE — 99213 OFFICE O/P EST LOW 20 MIN: CPT | Performed by: INTERNAL MEDICINE

## 2024-04-01 NOTE — PROGRESS NOTES
Pulmonary Office Follow-up    Subjective     Maine Olguin is seen today at the office for   Chief Complaint   Patient presents with    Asthma    Follow-up     1 month    Results         HPI  Maine Olguin is a 48 y.o. female with a PMH significant for asthma presents for follow-up patient has been doing well and denies any shortness of breath cough or wheezing she uses albuterol inhaler only occasionally as needed      Tobacco use history:  Never smoker      Patient Active Problem List   Diagnosis    Family history of melanoma, father    Pure hypercholesterolemia    Anemia    Prediabetes    Depression    Anxiety       Review of Systems  Review of Systems   All other systems reviewed and are negative.    As described in the HPI. Otherwise, remainder of ROS (14 systems) were negative.    Medications, Allergies, Social, and Family Histories reviewed as per EMR.    Result Review :            Objective     Vitals:    04/01/24 1545   BP: 112/79   Pulse: 68   Resp: 14   Temp: 98.4 °F (36.9 °C)   SpO2: 97%         04/01/24  1545   Weight: 101 kg (223 lb)       Physical Exam  Vitals and nursing note reviewed.   Constitutional:       Appearance: Normal appearance.   HENT:      Head: Normocephalic and atraumatic.      Nose: Nose normal.      Mouth/Throat:      Mouth: Mucous membranes are moist.      Pharynx: Oropharynx is clear.   Eyes:      Extraocular Movements: Extraocular movements intact.      Conjunctiva/sclera: Conjunctivae normal.      Pupils: Pupils are equal, round, and reactive to light.   Cardiovascular:      Rate and Rhythm: Normal rate and regular rhythm.      Pulses: Normal pulses.      Heart sounds: Normal heart sounds.   Pulmonary:      Effort: Pulmonary effort is normal.      Breath sounds: Normal breath sounds.   Abdominal:      General: Abdomen is flat. Bowel sounds are normal.      Palpations: Abdomen is soft.   Musculoskeletal:         General: Normal range of motion.      Cervical back: Normal  range of motion and neck supple.   Skin:     General: Skin is warm.      Capillary Refill: Capillary refill takes 2 to 3 seconds.   Neurological:      General: No focal deficit present.      Mental Status: She is alert and oriented to person, place, and time.   Psychiatric:         Mood and Affect: Mood normal.         Behavior: Behavior normal.         Mammo Screening Digital Tomosynthesis Bilateral With CAD    Result Date: 2/27/2024   Benign mammogram. Suggest routine mammographic screening.  RECOMMENDATION(S):  ROUTINE MAMMOGRAM AND CLINICAL EVALUATION IN 12 MONTHS.   BIRADS:  DIAGNOSTIC CATEGORY 2--BENIGN FINDING   BREAST COMPOSITION: Scattered areas fibroglandular density.  PLEASE NOTE:  A NORMAL MAMMOGRAM DOES NOT EXCLUDE THE POSSIBILITY OF BREAST CANCER. ANY CLINICALLY SUSPICIOUS PALPABLE LUMP SHOULD BE BIOPSIED.      SAMMI MARS MD       Electronically Signed and Approved By: SAMMI MARS MD on 2/27/2024 at 8:55             CT Chest With Contrast Diagnostic    Result Date: 2/12/2024    No abnormality seen on CT chest.  Lungs are clear.     EVIE BAÑUELOS MD       Electronically Signed and Approved By: EVIE BAÑUELOS MD on 2/12/2024 at 2:17               Assessment & Plan     Diagnoses and all orders for this visit:    1. Mild intermittent asthma, unspecified whether complicated (Primary)         Discussion/ Recommendations:   Patient is stable  Patient is advised to continue her present medications  PFTs reviewed are within normal limits  Advised to reduce weight and regular exercise her BMI is 34.93  Vaccinations discussed and recommended             Return in about 6 months (around 10/1/2024).          This document has been electronically signed by Tej Lou MD on April 1, 2024 15:50 EDT

## 2024-10-09 ENCOUNTER — PATIENT MESSAGE (OUTPATIENT)
Dept: FAMILY MEDICINE CLINIC | Facility: CLINIC | Age: 49
End: 2024-10-09
Payer: OTHER GOVERNMENT

## 2024-10-09 DIAGNOSIS — N95.1 PERIMENOPAUSAL: Primary | ICD-10-CM

## 2024-10-09 DIAGNOSIS — E56.9 VITAMIN DEFICIENCY: ICD-10-CM

## 2024-10-09 DIAGNOSIS — R73.09 ABNORMAL GLUCOSE: ICD-10-CM

## 2024-10-09 DIAGNOSIS — R79.89 ABNORMAL THYROID BLOOD TEST: ICD-10-CM

## 2024-10-09 DIAGNOSIS — R53.83 OTHER FATIGUE: ICD-10-CM

## 2024-10-09 DIAGNOSIS — Z51.81 MEDICATION MONITORING ENCOUNTER: ICD-10-CM

## 2024-10-09 DIAGNOSIS — Z00.00 ANNUAL PHYSICAL EXAM: ICD-10-CM

## 2024-10-09 DIAGNOSIS — E78.00 PURE HYPERCHOLESTEROLEMIA: ICD-10-CM

## 2024-10-09 DIAGNOSIS — E55.9 VITAMIN D DEFICIENCY: ICD-10-CM

## 2024-12-02 ENCOUNTER — LAB (OUTPATIENT)
Facility: HOSPITAL | Age: 49
End: 2024-12-02
Payer: OTHER GOVERNMENT

## 2024-12-02 DIAGNOSIS — E53.8 FOLATE DEFICIENCY: ICD-10-CM

## 2024-12-02 DIAGNOSIS — E55.9 VITAMIN D DEFICIENCY: ICD-10-CM

## 2024-12-02 DIAGNOSIS — N95.1 PERIMENOPAUSAL: ICD-10-CM

## 2024-12-02 DIAGNOSIS — R73.03 PREDIABETES: ICD-10-CM

## 2024-12-02 DIAGNOSIS — R53.83 OTHER FATIGUE: ICD-10-CM

## 2024-12-02 DIAGNOSIS — E53.8 B12 DEFICIENCY: ICD-10-CM

## 2024-12-02 DIAGNOSIS — R73.09 ABNORMAL GLUCOSE: ICD-10-CM

## 2024-12-02 DIAGNOSIS — E78.00 PURE HYPERCHOLESTEROLEMIA: ICD-10-CM

## 2024-12-02 DIAGNOSIS — Z00.00 ANNUAL PHYSICAL EXAM: ICD-10-CM

## 2024-12-02 DIAGNOSIS — Z51.81 MEDICATION MONITORING ENCOUNTER: ICD-10-CM

## 2024-12-02 DIAGNOSIS — E56.9 VITAMIN DEFICIENCY: ICD-10-CM

## 2024-12-02 LAB
25(OH)D3 SERPL-MCNC: 19.5 NG/ML (ref 30–100)
ALBUMIN SERPL-MCNC: 4.2 G/DL (ref 3.5–5.2)
ALBUMIN/GLOB SERPL: 1.3 G/DL
ALP SERPL-CCNC: 86 U/L (ref 39–117)
ALT SERPL W P-5'-P-CCNC: 11 U/L (ref 1–33)
ANION GAP SERPL CALCULATED.3IONS-SCNC: 8.6 MMOL/L (ref 5–15)
AST SERPL-CCNC: 16 U/L (ref 1–32)
BASOPHILS # BLD AUTO: 0.08 10*3/MM3 (ref 0–0.2)
BASOPHILS NFR BLD AUTO: 1.3 % (ref 0–1.5)
BILIRUB SERPL-MCNC: 0.4 MG/DL (ref 0–1.2)
BUN SERPL-MCNC: 11 MG/DL (ref 6–20)
BUN/CREAT SERPL: 11.5 (ref 7–25)
CALCIUM SPEC-SCNC: 9.1 MG/DL (ref 8.6–10.5)
CHLORIDE SERPL-SCNC: 102 MMOL/L (ref 98–107)
CHOLEST SERPL-MCNC: 202 MG/DL (ref 0–200)
CO2 SERPL-SCNC: 25.4 MMOL/L (ref 22–29)
CREAT SERPL-MCNC: 0.96 MG/DL (ref 0.57–1)
DEPRECATED RDW RBC AUTO: 39.9 FL (ref 37–54)
EGFRCR SERPLBLD CKD-EPI 2021: 73.6 ML/MIN/1.73
EOSINOPHIL # BLD AUTO: 0.09 10*3/MM3 (ref 0–0.4)
EOSINOPHIL NFR BLD AUTO: 1.4 % (ref 0.3–6.2)
ERYTHROCYTE [DISTWIDTH] IN BLOOD BY AUTOMATED COUNT: 12.6 % (ref 12.3–15.4)
FOLATE SERPL-MCNC: >20 NG/ML (ref 4.78–24.2)
FSH SERPL-ACNC: 5.81 MIU/ML
GLOBULIN UR ELPH-MCNC: 3.3 GM/DL
GLUCOSE SERPL-MCNC: 88 MG/DL (ref 65–99)
HBA1C MFR BLD: 6.1 % (ref 4.8–5.6)
HCT VFR BLD AUTO: 37.6 % (ref 34–46.6)
HDLC SERPL-MCNC: 49 MG/DL (ref 40–60)
HGB BLD-MCNC: 11.8 G/DL (ref 12–15.9)
IMM GRANULOCYTES # BLD AUTO: 0.01 10*3/MM3 (ref 0–0.05)
IMM GRANULOCYTES NFR BLD AUTO: 0.2 % (ref 0–0.5)
LDLC SERPL CALC-MCNC: 125 MG/DL (ref 0–100)
LDLC/HDLC SERPL: 2.49 {RATIO}
LH SERPL-ACNC: 17.9 MIU/ML
LYMPHOCYTES # BLD AUTO: 1.85 10*3/MM3 (ref 0.7–3.1)
LYMPHOCYTES NFR BLD AUTO: 29.2 % (ref 19.6–45.3)
MCH RBC QN AUTO: 27.2 PG (ref 26.6–33)
MCHC RBC AUTO-ENTMCNC: 31.4 G/DL (ref 31.5–35.7)
MCV RBC AUTO: 86.6 FL (ref 79–97)
MONOCYTES # BLD AUTO: 0.44 10*3/MM3 (ref 0.1–0.9)
MONOCYTES NFR BLD AUTO: 7 % (ref 5–12)
NEUTROPHILS NFR BLD AUTO: 3.86 10*3/MM3 (ref 1.7–7)
NEUTROPHILS NFR BLD AUTO: 60.9 % (ref 42.7–76)
NRBC BLD AUTO-RTO: 0 /100 WBC (ref 0–0.2)
PLATELET # BLD AUTO: 272 10*3/MM3 (ref 140–450)
PMV BLD AUTO: 10.6 FL (ref 6–12)
POTASSIUM SERPL-SCNC: 4 MMOL/L (ref 3.5–5.2)
PROT SERPL-MCNC: 7.5 G/DL (ref 6–8.5)
RBC # BLD AUTO: 4.34 10*6/MM3 (ref 3.77–5.28)
SODIUM SERPL-SCNC: 136 MMOL/L (ref 136–145)
T4 FREE SERPL-MCNC: 0.96 NG/DL (ref 0.92–1.68)
TRIGL SERPL-MCNC: 155 MG/DL (ref 0–150)
TSH SERPL DL<=0.05 MIU/L-ACNC: 2.28 UIU/ML (ref 0.27–4.2)
VIT B12 BLD-MCNC: 526 PG/ML (ref 211–946)
VLDLC SERPL-MCNC: 28 MG/DL (ref 5–40)
WBC NRBC COR # BLD AUTO: 6.33 10*3/MM3 (ref 3.4–10.8)

## 2024-12-02 PROCEDURE — 82607 VITAMIN B-12: CPT

## 2024-12-02 PROCEDURE — 84207 ASSAY OF VITAMIN B-6: CPT

## 2024-12-02 PROCEDURE — 82746 ASSAY OF FOLIC ACID SERUM: CPT

## 2024-12-02 PROCEDURE — 83002 ASSAY OF GONADOTROPIN (LH): CPT

## 2024-12-02 PROCEDURE — 80061 LIPID PANEL: CPT

## 2024-12-02 PROCEDURE — 83001 ASSAY OF GONADOTROPIN (FSH): CPT

## 2024-12-02 PROCEDURE — 36415 COLL VENOUS BLD VENIPUNCTURE: CPT

## 2024-12-02 PROCEDURE — 80053 COMPREHEN METABOLIC PANEL: CPT

## 2024-12-02 PROCEDURE — 84439 ASSAY OF FREE THYROXINE: CPT

## 2024-12-02 PROCEDURE — 85025 COMPLETE CBC W/AUTO DIFF WBC: CPT

## 2024-12-02 PROCEDURE — 82306 VITAMIN D 25 HYDROXY: CPT

## 2024-12-02 PROCEDURE — 83036 HEMOGLOBIN GLYCOSYLATED A1C: CPT

## 2024-12-02 PROCEDURE — 84425 ASSAY OF VITAMIN B-1: CPT

## 2024-12-02 PROCEDURE — 84443 ASSAY THYROID STIM HORMONE: CPT

## 2024-12-04 ENCOUNTER — OFFICE VISIT (OUTPATIENT)
Dept: FAMILY MEDICINE CLINIC | Facility: CLINIC | Age: 49
End: 2024-12-04
Payer: OTHER GOVERNMENT

## 2024-12-04 VITALS
TEMPERATURE: 98.2 F | HEIGHT: 67 IN | BODY MASS INDEX: 34.34 KG/M2 | OXYGEN SATURATION: 98 % | DIASTOLIC BLOOD PRESSURE: 80 MMHG | WEIGHT: 218.8 LBS | SYSTOLIC BLOOD PRESSURE: 112 MMHG | HEART RATE: 84 BPM

## 2024-12-04 DIAGNOSIS — R23.2 HOT FLASHES: ICD-10-CM

## 2024-12-04 DIAGNOSIS — N95.1 PERIMENOPAUSAL: ICD-10-CM

## 2024-12-04 DIAGNOSIS — Z12.11 SCREENING FOR COLON CANCER: ICD-10-CM

## 2024-12-04 DIAGNOSIS — D64.9 ANEMIA, UNSPECIFIED TYPE: ICD-10-CM

## 2024-12-04 DIAGNOSIS — E55.9 VITAMIN D DEFICIENCY: ICD-10-CM

## 2024-12-04 DIAGNOSIS — Z23 NEED FOR PNEUMOCOCCAL VACCINATION: ICD-10-CM

## 2024-12-04 DIAGNOSIS — E78.2 MIXED HYPERLIPIDEMIA: ICD-10-CM

## 2024-12-04 DIAGNOSIS — R73.03 PREDIABETES: Primary | ICD-10-CM

## 2024-12-04 DIAGNOSIS — F41.9 ANXIETY: ICD-10-CM

## 2024-12-04 DIAGNOSIS — L21.9 SEBORRHEIC DERMATITIS: ICD-10-CM

## 2024-12-04 DIAGNOSIS — J45.20 MILD INTERMITTENT ASTHMA WITHOUT COMPLICATION: ICD-10-CM

## 2024-12-04 DIAGNOSIS — Z12.31 VISIT FOR SCREENING MAMMOGRAM: ICD-10-CM

## 2024-12-04 LAB — PYRIDOXAL PHOS SERPL-MCNC: 5.4 UG/L (ref 3.4–65.2)

## 2024-12-04 PROCEDURE — 99214 OFFICE O/P EST MOD 30 MIN: CPT | Performed by: FAMILY MEDICINE

## 2024-12-04 RX ORDER — KETOCONAZOLE 20 MG/G
1 CREAM TOPICAL 2 TIMES DAILY
Qty: 30 G | Refills: 1 | Status: SHIPPED | OUTPATIENT
Start: 2024-12-04

## 2024-12-04 RX ORDER — FLUOXETINE HCL 20 MG
CAPSULE ORAL
COMMUNITY
Start: 2024-09-20

## 2024-12-04 NOTE — PROGRESS NOTES
Chief Complaint  Eye Problem (Redness-bilateral )    Subjective          Maine Olguin presents to [facility]    History of Present Illness     History of Present Illness  The patient is a 49-year-old female who presents today for a follow-up.    She reports difficulty in losing weight despite regular exercise and dietary modifications. She is hesitant to try weight loss medications like Ozempic or Mounjaro due to concerns about pancreatitis. She does not require any medication refills at this time.    She has been diagnosed with mild asthma by Dr. Lou and uses an albuterol inhaler as needed.    She underwent a Cologuard test at age 47 and plans to have a colonoscopy in the future. She reports no gastrointestinal issues.    She has a history of seborrheic dermatitis, for which she uses ketoconazole. She has an upcoming appointment with an optometrist due to blepharitis.    She switched from Wellbutrin to Prozac in September 2024 and has been seeing a psychiatric nurse practitioner. She reports feeling better and less stressed, although she experiences teeth clenching, for which she plans to get a dental guard.    She had pneumonia in October 2024 and was treated with Augmentin. She reports no body or joint aches but mentions recurrent infections.    She has noticed an enlargement in her neck, but her thyroid panel is normal.    She has a history of basal cell carcinoma, for which she underwent laser treatment and is currently undergoing scar revision. She also had a granuloma after the birth of her first child.    FAMILY HISTORY  Her mother has diabetes and multiple sclerosis.         Current Outpatient Medications   Medication Instructions    ALBUTEROL IN     albuterol sulfate  (90 Base) MCG/ACT inhaler 2 puffs, Inhalation, Every 4 Hours PRN    Fluticasone-Salmeterol (ADVAIR/WIXELA) 250-50 MCG/ACT DISKUS 1 puff, Inhalation, 2 Times Daily - RT    ipratropium-albuterol  "(DUO-NEB) 0.5-2.5 mg/3 ml nebulizer 3 mL    ketoconazole (NIZORAL) 2 % cream 1 Application, Topical, 2 Times Daily    PROzac 20 MG capsule        The following portions of the patient's history were reviewed and updated as appropriate: allergies, current medications, past family history, past medical history, past social history, past surgical history, and problem list.    Objective   Vital Signs:   /80 (BP Location: Left arm, Patient Position: Sitting, Cuff Size: Adult)   Pulse 84   Temp 98.2 °F (36.8 °C) (Oral)   Ht 170.2 cm (67\")   Wt 99.2 kg (218 lb 12.8 oz)   SpO2 98%   BMI 34.27 kg/m²     BP Readings from Last 3 Encounters:   12/04/24 112/80   04/01/24 112/79   02/13/24 134/84     Wt Readings from Last 3 Encounters:   12/04/24 99.2 kg (218 lb 12.8 oz)   04/01/24 101 kg (223 lb)   02/13/24 97.1 kg (214 lb)     BMI is >= 30 and <35. (Class 1 Obesity). The following options were offered after discussion;: exercise counseling/recommendations and nutrition counseling/recommendations     Physical Exam  Vitals reviewed.   Constitutional:       Appearance: Normal appearance.   HENT:      Head: Normocephalic and atraumatic.      Right Ear: External ear normal.      Left Ear: External ear normal.      Nose: Nose normal.   Eyes:      Conjunctiva/sclera: Conjunctivae normal.   Cardiovascular:      Rate and Rhythm: Normal rate and regular rhythm.      Heart sounds: No murmur heard.     No friction rub. No gallop.   Pulmonary:      Effort: Pulmonary effort is normal.      Breath sounds: Normal breath sounds. No wheezing or rhonchi.   Abdominal:      General: Bowel sounds are normal. There is no distension.      Palpations: Abdomen is soft.      Tenderness: There is no abdominal tenderness.   Skin:     Comments: Keloid scar noted on the left medial breast area.   Neurological:      Mental Status: She is alert and oriented to person, place, and time.      Cranial Nerves: No cranial nerve deficit.   Psychiatric:    "      Mood and Affect: Mood and affect normal.         Behavior: Behavior normal.         Thought Content: Thought content normal.         Judgment: Judgment normal.            Result Review :   The following data was reviewed by: Tanmay Mayberry DO on 12/04/2024:  Common labs          2/6/2024    07:52 12/2/2024    07:55   Common Labs   Glucose 80  88    BUN 14  11    Creatinine 0.97  0.96    Sodium 140  136    Potassium 3.4  4.0    Chloride 102  102    Calcium 9.2  9.1    Albumin 4.4  4.2    Total Bilirubin 0.5  0.4    Alkaline Phosphatase 89  86    AST (SGOT) 13  16    ALT (SGPT) 11  11    WBC 11.45  6.33    Hemoglobin 12.1  11.8    Hematocrit 37.2  37.6    Platelets 295  272    Total Cholesterol 173  202    Triglycerides 138  155    HDL Cholesterol 60  49    LDL Cholesterol  89  125    Hemoglobin A1C 6.20  6.10             Lab Results   Component Value Date    COVID19 NEGATIVE 05/17/2022    RAPSCRN Negative 05/03/2023    BILIRUBINUR Negative 05/18/2022       Results  Laboratory Studies  A1c was 6.1%. LDL was 125. B12 levels were normal. Folate levels were normal. Anemia levels were slightly low. Kidney function and liver enzymes were normal. Potassium level was normal. Vitamin D level was low. Thyroid levels were normal.    Testing  Lung function test showed normal spirometry.    Procedures        Assessment and Plan    Diagnoses and all orders for this visit:    1. Prediabetes (Primary)  -     CBC & Differential; Future  -     Comprehensive Metabolic Panel; Future  -     C-reactive protein; Future  -     Hemoglobin A1c; Future    2. Mixed hyperlipidemia    3. Anemia, unspecified type    4. Vitamin D deficiency  -     Vitamin D,25-Hydroxy; Future    5. Perimenopausal  -     FSH & LH; Future    6. Hot flashes  -     FSH & LH; Future  -     NGUYEN; Future    7. Mild intermittent asthma without complication    8. Visit for screening mammogram  -     Mammo Screening Digital Tomosynthesis Bilateral With CAD;  Future    9. Screening for colon cancer  -     Ambulatory Referral For Screening Colonoscopy    10. Need for pneumococcal vaccination  -     Pneumococcal Antibody (23 Serotype); Future    11. Seborrheic dermatitis    12. Anxiety    Other orders  -     ketoconazole (NIZORAL) 2 % cream; Apply 1 Application topically to the appropriate area as directed 2 (Two) Times a Day.  Dispense: 30 g; Refill: 1        Assessment & Plan  1. Prediabetes.  Her A1c has decreased slightly to 6.1%, but remains in the prediabetic range. She is advised to continue lifestyle modifications, including regular exercise and a balanced diet.    2. Mixed Hyperlipidemia.  Her LDL level has increased to 125. She is advised to continue dietary modifications to improve her lipid profile.    3. Anemia.  She has a consistent hemoglobin level of 11. No new treatment is recommended at this time.    4. Vitamin D Deficiency.  Her vitamin D level is slightly low. She is advised to take 1000 units of vitamin D daily.    5. Mild Asthma.  She has been diagnosed with mild asthma and has an albuterol inhaler for use as needed. Lung function tests are normal and reassuring.    6. Perimenopause.  She reports symptoms of perimenopause, including hot flashes, brain fog, and difficulty sleeping. She is advised to monitor her symptoms and consider treatment options if they worsen. We did discuss considering Veozah.    7. Seborrheic Dermatitis.  She has seborrheic dermatitis around her eyes. Hydrocortisone 1% is recommended for use around the eyes. She is also advised to use Head and Shoulders shampoo on the affected areas, leaving it on for a few minutes before washing off. A prescription for ketoconazole ointment 2%, to be applied twice daily, is provided.    8. Keloid.  She has a keloid scar that is being treated with laser therapy. She is advised to continue follow-up with dermatology for further management.    9. Depression.  She switched from Wellbutrin to  Prozac in September and reports feeling much better. She is currently taking Prozac 20 mg once a day. She reports teeth clenching as a side effect and plans to get a dental guard.    10. Health Maintenance.  A colonoscopy is ordered for September 2025. A pneumococcal 20 vaccination is recommended but currently on backorder. She is advised to check back in a month or visit a local pharmacy. A screening mammogram was normal on 02/26/2024, and the next one is scheduled for February 2025.    11. Medication Management.  A prescription for ketoconazole ointment 2% is provided. Labs including CBC, CMP, pneumococcal titers, NGUYEN, CRP, FSH, LH, and A1c are ordered for the next visit. Vitamin D levels will be rechecked. An ultrasound of the thyroid was suggested but declined. A prescription for Vioza for menopausal hot flashes was discussed but not provided.    Follow-up  Return in 3 months for follow-up.       Medications Discontinued During This Encounter   Medication Reason    buPROPion XL (Wellbutrin XL) 300 MG 24 hr tablet           Follow Up   Return in about 3 months (around 3/4/2025) for Prediabetes.  Patient was given instructions and counseling regarding her condition or for health maintenance advice. Please see specific information pulled into the AVS if appropriate.     Patient or patient representative verbalized consent for the use of Ambient Listening during the visit with  Tanmay Mayberry DO for chart documentation. 12/4/2024  10:59 EST    Tanmay Mayberry DO  12/04/24  11:31 EST

## 2024-12-06 LAB — VIT B1 BLD-SCNC: 79.1 NMOL/L (ref 66.5–200)

## 2025-01-15 DIAGNOSIS — L21.9 SEBORRHEIC DERMATITIS: Primary | ICD-10-CM

## 2025-01-23 ENCOUNTER — TELEPHONE (OUTPATIENT)
Dept: GASTROENTEROLOGY | Facility: CLINIC | Age: 50
End: 2025-01-23
Payer: OTHER GOVERNMENT

## 2025-01-23 NOTE — TELEPHONE ENCOUNTER
Spoke with patient regarding a referral we received from Tanmay Mayberry for a colon screening. Patient has scheduled a phone screening appointment on 3/13/25@3:00.

## 2025-01-24 ENCOUNTER — PATIENT MESSAGE (OUTPATIENT)
Dept: FAMILY MEDICINE CLINIC | Facility: CLINIC | Age: 50
End: 2025-01-24
Payer: OTHER GOVERNMENT

## 2025-01-27 DIAGNOSIS — L98.9 SKIN LESION: ICD-10-CM

## 2025-01-27 DIAGNOSIS — L21.9 SEBORRHEIC DERMATITIS: Primary | ICD-10-CM

## 2025-01-31 ENCOUNTER — LAB (OUTPATIENT)
Facility: HOSPITAL | Age: 50
End: 2025-01-31
Payer: OTHER GOVERNMENT

## 2025-01-31 ENCOUNTER — TRANSCRIBE ORDERS (OUTPATIENT)
Dept: ADMINISTRATIVE | Facility: HOSPITAL | Age: 50
End: 2025-01-31
Payer: OTHER GOVERNMENT

## 2025-01-31 DIAGNOSIS — L23.9 ALLERGIC CONTACT DERMATITIS, UNSPECIFIED TRIGGER: Primary | ICD-10-CM

## 2025-01-31 DIAGNOSIS — L23.9 ALLERGIC CONTACT DERMATITIS, UNSPECIFIED TRIGGER: ICD-10-CM

## 2025-01-31 LAB
CHROMATIN AB SERPL-ACNC: <10 IU/ML (ref 0–14)
CRP SERPL-MCNC: 0.93 MG/DL (ref 0–0.5)
ERYTHROCYTE [SEDIMENTATION RATE] IN BLOOD: 20 MM/HR (ref 0–20)

## 2025-01-31 PROCEDURE — 85652 RBC SED RATE AUTOMATED: CPT

## 2025-01-31 PROCEDURE — 86038 ANTINUCLEAR ANTIBODIES: CPT

## 2025-01-31 PROCEDURE — 36415 COLL VENOUS BLD VENIPUNCTURE: CPT

## 2025-01-31 PROCEDURE — 86140 C-REACTIVE PROTEIN: CPT

## 2025-01-31 PROCEDURE — 86431 RHEUMATOID FACTOR QUANT: CPT

## 2025-02-03 LAB — ANA SER QL IF: NEGATIVE

## 2025-03-13 ENCOUNTER — PREP FOR SURGERY (OUTPATIENT)
Dept: OTHER | Facility: HOSPITAL | Age: 50
End: 2025-03-13
Payer: OTHER GOVERNMENT

## 2025-03-13 ENCOUNTER — CLINICAL SUPPORT (OUTPATIENT)
Dept: GASTROENTEROLOGY | Facility: CLINIC | Age: 50
End: 2025-03-13
Payer: OTHER GOVERNMENT

## 2025-03-13 ENCOUNTER — TELEPHONE (OUTPATIENT)
Dept: GASTROENTEROLOGY | Facility: CLINIC | Age: 50
End: 2025-03-13
Payer: OTHER GOVERNMENT

## 2025-03-13 ENCOUNTER — LAB (OUTPATIENT)
Facility: HOSPITAL | Age: 50
End: 2025-03-13
Payer: OTHER GOVERNMENT

## 2025-03-13 DIAGNOSIS — E55.9 VITAMIN D DEFICIENCY: ICD-10-CM

## 2025-03-13 DIAGNOSIS — Z12.11 SCREENING FOR MALIGNANT NEOPLASM OF COLON: Primary | ICD-10-CM

## 2025-03-13 DIAGNOSIS — R73.03 PREDIABETES: ICD-10-CM

## 2025-03-13 DIAGNOSIS — N95.1 PERIMENOPAUSAL: ICD-10-CM

## 2025-03-13 DIAGNOSIS — Z80.0 FAMILY HISTORY OF COLON CANCER: ICD-10-CM

## 2025-03-13 DIAGNOSIS — Z23 NEED FOR PNEUMOCOCCAL VACCINATION: ICD-10-CM

## 2025-03-13 DIAGNOSIS — R23.2 HOT FLASHES: ICD-10-CM

## 2025-03-13 LAB
25(OH)D3 SERPL-MCNC: 35.1 NG/ML (ref 30–100)
ALBUMIN SERPL-MCNC: 4.4 G/DL (ref 3.5–5.2)
ALBUMIN/GLOB SERPL: 1.3 G/DL
ALP SERPL-CCNC: 97 U/L (ref 39–117)
ALT SERPL W P-5'-P-CCNC: 12 U/L (ref 1–33)
ANION GAP SERPL CALCULATED.3IONS-SCNC: 11.3 MMOL/L (ref 5–15)
AST SERPL-CCNC: 19 U/L (ref 1–32)
BASOPHILS # BLD AUTO: 0.08 10*3/MM3 (ref 0–0.2)
BASOPHILS NFR BLD AUTO: 1.3 % (ref 0–1.5)
BILIRUB SERPL-MCNC: 0.5 MG/DL (ref 0–1.2)
BUN SERPL-MCNC: 15 MG/DL (ref 6–20)
BUN/CREAT SERPL: 15.8 (ref 7–25)
CALCIUM SPEC-SCNC: 9.7 MG/DL (ref 8.6–10.5)
CHLORIDE SERPL-SCNC: 100 MMOL/L (ref 98–107)
CO2 SERPL-SCNC: 25.7 MMOL/L (ref 22–29)
CREAT SERPL-MCNC: 0.95 MG/DL (ref 0.57–1)
CRP SERPL-MCNC: 0.91 MG/DL (ref 0–0.5)
DEPRECATED RDW RBC AUTO: 40.1 FL (ref 37–54)
EGFRCR SERPLBLD CKD-EPI 2021: 74.5 ML/MIN/1.73
EOSINOPHIL # BLD AUTO: 0.08 10*3/MM3 (ref 0–0.4)
EOSINOPHIL NFR BLD AUTO: 1.3 % (ref 0.3–6.2)
ERYTHROCYTE [DISTWIDTH] IN BLOOD BY AUTOMATED COUNT: 12.9 % (ref 12.3–15.4)
FSH SERPL-ACNC: 8.65 MIU/ML
GLOBULIN UR ELPH-MCNC: 3.3 GM/DL
GLUCOSE SERPL-MCNC: 87 MG/DL (ref 65–99)
HBA1C MFR BLD: 5.9 % (ref 4.8–5.6)
HCT VFR BLD AUTO: 38.5 % (ref 34–46.6)
HGB BLD-MCNC: 12.6 G/DL (ref 12–15.9)
IMM GRANULOCYTES # BLD AUTO: 0.01 10*3/MM3 (ref 0–0.05)
IMM GRANULOCYTES NFR BLD AUTO: 0.2 % (ref 0–0.5)
LH SERPL-ACNC: 5.57 MIU/ML
LYMPHOCYTES # BLD AUTO: 2.05 10*3/MM3 (ref 0.7–3.1)
LYMPHOCYTES NFR BLD AUTO: 33.5 % (ref 19.6–45.3)
MCH RBC QN AUTO: 28.1 PG (ref 26.6–33)
MCHC RBC AUTO-ENTMCNC: 32.7 G/DL (ref 31.5–35.7)
MCV RBC AUTO: 85.9 FL (ref 79–97)
MONOCYTES # BLD AUTO: 0.49 10*3/MM3 (ref 0.1–0.9)
MONOCYTES NFR BLD AUTO: 8 % (ref 5–12)
NEUTROPHILS NFR BLD AUTO: 3.41 10*3/MM3 (ref 1.7–7)
NEUTROPHILS NFR BLD AUTO: 55.7 % (ref 42.7–76)
NRBC BLD AUTO-RTO: 0 /100 WBC (ref 0–0.2)
PLATELET # BLD AUTO: 301 10*3/MM3 (ref 140–450)
PMV BLD AUTO: 10.3 FL (ref 6–12)
POTASSIUM SERPL-SCNC: 4.2 MMOL/L (ref 3.5–5.2)
PROT SERPL-MCNC: 7.7 G/DL (ref 6–8.5)
RBC # BLD AUTO: 4.48 10*6/MM3 (ref 3.77–5.28)
SODIUM SERPL-SCNC: 137 MMOL/L (ref 136–145)
WBC NRBC COR # BLD AUTO: 6.12 10*3/MM3 (ref 3.4–10.8)

## 2025-03-13 PROCEDURE — 82306 VITAMIN D 25 HYDROXY: CPT

## 2025-03-13 PROCEDURE — 80053 COMPREHEN METABOLIC PANEL: CPT

## 2025-03-13 PROCEDURE — 83001 ASSAY OF GONADOTROPIN (FSH): CPT

## 2025-03-13 PROCEDURE — 86140 C-REACTIVE PROTEIN: CPT

## 2025-03-13 PROCEDURE — 83036 HEMOGLOBIN GLYCOSYLATED A1C: CPT

## 2025-03-13 PROCEDURE — 83002 ASSAY OF GONADOTROPIN (LH): CPT

## 2025-03-13 PROCEDURE — 86317 IMMUNOASSAY INFECTIOUS AGENT: CPT

## 2025-03-13 PROCEDURE — 85025 COMPLETE CBC W/AUTO DIFF WBC: CPT

## 2025-03-13 PROCEDURE — 86038 ANTINUCLEAR ANTIBODIES: CPT

## 2025-03-13 RX ORDER — LOTEPREDNOL ETABONATE 5 MG/G
OINTMENT OPHTHALMIC
COMMUNITY
Start: 2025-01-24

## 2025-03-13 RX ORDER — PEG-3350, SODIUM SULFATE, SODIUM CHLORIDE, POTASSIUM CHLORIDE, SODIUM ASCORBATE AND ASCORBIC ACID 7.5-2.691G
KIT ORAL
Qty: 2000 ML | Refills: 0 | Status: SHIPPED | OUTPATIENT
Start: 2025-03-13

## 2025-03-13 RX ORDER — DEXTROAMPHETAMINE SACCHARATE, AMPHETAMINE ASPARTATE MONOHYDRATE, DEXTROAMPHETAMINE SULFATE AND AMPHETAMINE SULFATE 2.5; 2.5; 2.5; 2.5 MG/1; MG/1; MG/1; MG/1
10 CAPSULE, EXTENDED RELEASE ORAL DAILY
COMMUNITY
Start: 2025-03-03

## 2025-03-13 NOTE — TELEPHONE ENCOUNTER
3/13/2025    Dear Dr. Lou,      Patient Name: Maine Olguin  : 1975      This patient is waiting to have a Colonoscopy which I will perform at [facility] on __2025_____. Please respond to this request noting your recommendations regarding clearance from a Pulmonary  standpoint.  You may contact our office at [phone] option 3 with any questions. I appreciate your prompt response in this matter. Please return this form to our office as soon as possible to [place].    ____ I approve my patient from a Pulmonary  standpoint    ____ I do NOT approve my patient from a Pulmonary  standpoint at this time    Please inform our office if the patient requires additional follow-up from your office prior to scheduled procedure date.      Please specify clearance expiration date:____________________________________      Approving physician name (please print): _____________________________________________      Approving physician signature: ________________________________ Date:________________  Sincerely,  [facility] - Gastroenterology   Dr. Mary Ace          Please fax approval or denial to our office as soon as possible.

## 2025-03-13 NOTE — PROGRESS NOTES
Maine JAS Clau  1975  49 y.o.    Reason for call: Screening Colonoscopy  Prep prescribed: Moviprep  Prep instructions reviewed with patient and sent to patient via Collect  Is the patient currently on any injectable or oral medications for weight loss or diabetes? No  Clearance needed? Yes  If yes, what clearance is needed? Pulmonary  Clearance has been requested from Dr. Tej Lou  The patient has been scheduled for: Colonoscopy    If scheduled for screening colonoscopy Maine Olguin is aware they have been scheduled for a screening colonoscopy. Patient has expressed they are not having any symptoms at all.     After your procedure, you will be contacted with results. Please confirm the best phone # to reach the patient: [phone]  Family history of colon cancer? Yes  If yes, indicate relative: grandma  Tentative Procedure Date: 2025    Date/Place of last Scope: none  Able to obtain report? N/A    Family History   Problem Relation Age of Onset    Depression Mother     Diabetes Mother     Stroke Mother     Vision loss Mother     Cancer Father     Hyperlipidemia Father     Depression Maternal Grandmother     Diabetes Maternal Grandmother     Stroke Maternal Grandmother     Colon cancer Paternal Grandmother     Alcohol abuse Paternal Grandfather      Past Medical History:   Diagnosis Date    Anxiety 2017     No Known Allergies  Past Surgical History:   Procedure Laterality Date     SECTION  2017    HYSTERECTOMY      TONSILLECTOMY       Social History     Socioeconomic History    Marital status:    Tobacco Use    Smoking status: Never     Passive exposure: Never    Smokeless tobacco: Never   Vaping Use    Vaping status: Never Used   Substance and Sexual Activity    Alcohol use: Not Currently     Comment: Once every 1-2 months; rare    Drug use: Never    Sexual activity: Yes     Partners: Male     Birth control/protection: Surgical       Current Outpatient Medications:      albuterol sulfate  (90 Base) MCG/ACT inhaler, Inhale 2 puffs Every 4 (Four) Hours As Needed for Wheezing., Disp: 18 g, Rfl: 5    amphetamine-dextroamphetamine XR (ADDERALL XR) 10 MG 24 hr capsule, Take 1 capsule by mouth Daily, Disp: , Rfl:     ketoconazole (NIZORAL) 2 % cream, Apply 1 Application topically to the appropriate area as directed 2 (Two) Times a Day., Disp: 30 g, Rfl: 1    Lotemax 0.5 % ointment, APPLY RICE SIZE AMOUNT TO RIGHT LOWER EYELID AND LEFT UPPER EYELID TWICE A DAY, Disp: , Rfl:     sertraline (ZOLOFT) 50 MG tablet, Take 1 tablet by mouth Every Morning., Disp: , Rfl:     ALBUTEROL IN, , Disp: , Rfl:

## 2025-03-14 LAB — ANA SER QL: NEGATIVE

## 2025-03-14 NOTE — TELEPHONE ENCOUNTER
"Relay     \"Pt hasn't been seen in a year and will need an appt before giving pulmonary clearance.  Please make an appt for pt with first available APRN\"                 "

## 2025-03-15 NOTE — PROGRESS NOTES
Primary Care Provider  Tnamay Mayberry DO     Referring Provider  No ref. provider found     Patient or patient representative verbalized consent for the use of Ambient Listening during the visit with  MALU Bradley for chart documentation. 3/19/2025  09:49 EDT    Chief Complaint  Mild intermittent asthma, unspecified whether complicated; Shortness of Breath; and Follow-up    Subjective          History of Presenting Illness    History of Present Illness  The patient is a 49-year-old female, patient of Dr. Lou's who presents for management of asthma who presents for a follow-up visit today.      She is scheduled for her first colonoscopy on 06/30/2025 with Dr. Ace and is needing pulmonary clearance to have the procedure.  She typically consults with Dr. Lou for her asthma management. She reports infrequent use of albuterol, with the most recent instance being yesterday due to exposure to wind outdoors. She experienced mild tightness post-exposure but does not require the medication more than once a month. Her symptoms are primarily reactive, triggered by environmental factors such as pollen. She also notes occasional irritation from chemical exposure. She reports no systemic symptoms such as fever or chills, and no hemoptysis, chest pain, or lower extremity edema. She received the pneumonia vaccine yesterday.    SOCIAL HISTORY  She does not smoke.      IMMUNIZATIONS  She received the pneumonia vaccine yesterday.    Patient denies fever, chills, night sweats, swollen glands in the head and neck, unintentional weight loss, hemoptysis, purulent sputum production, dysphagia, chest pain, palpitations, chest tightness, abdominal pain, nausea, vomiting, and diarrhea.  Patient also denies any myalgias, changes in sense of taste and/or smell, sore throat, any other coronavirus or flu-like symptoms.  Patient denies any leg swelling, orthopnea, paroxysmal nocturnal dyspnea.  Patient is able to perform  activities of daily living.        Review of Systems     Family History   Problem Relation Age of Onset    Depression Mother     Diabetes Mother     Stroke Mother     Vision loss Mother     Cancer Father     Hyperlipidemia Father     Depression Maternal Grandmother     Diabetes Maternal Grandmother     Stroke Maternal Grandmother     Colon cancer Paternal Grandmother     Alcohol abuse Paternal Grandfather         Social History     Socioeconomic History    Marital status:    Tobacco Use    Smoking status: Never     Passive exposure: Never    Smokeless tobacco: Never   Vaping Use    Vaping status: Never Used   Substance and Sexual Activity    Alcohol use: Not Currently     Comment: Once every 1-2 months; rare    Drug use: Never    Sexual activity: Yes     Partners: Male     Birth control/protection: Surgical        Past Medical History:   Diagnosis Date    ADHD (attention deficit hyperactivity disorder), inattentive type 02/01/2025    Anxiety 08/2017        Immunization History   Administered Date(s) Administered    COVID-19 (MODERNA) 1st,2nd,3rd Dose Monovalent 01/06/2021, 02/02/2021    COVID-19 (PFIZER) Purple Cap Monovalent 12/13/2021    FluMist 2-49yrs 11/10/2015    FluMist 2-49yrs (Nasal) 10/02/2012    Fluzone  >6mos 10/17/2013, 10/05/2024    Fluzone (or Fluarix & Flulaval for VFC) >6mos 10/28/2016, 11/04/2021, 10/27/2022, 11/08/2023    Influenza Injectable Mdck Pf Quad 11/28/2017, 11/13/2020    Influenza Seasonal Injectable 11/03/2014    MMR 08/30/1978, 01/10/1996, 04/16/2007    Measles 09/06/2006    PPD Test 10/02/2012, 11/10/2015, 10/20/2017    Pneumococcal Conjugate 20-Valent (PCV20) 03/18/2025    Tdap 08/04/2015, 08/14/2020       No Known Allergies       Current Outpatient Medications:     albuterol sulfate  (90 Base) MCG/ACT inhaler, Inhale 2 puffs Every 4 (Four) Hours As Needed for Wheezing., Disp: 18 g, Rfl: 5    amphetamine-dextroamphetamine XR (ADDERALL XR) 10 MG 24 hr capsule, Take 1  "capsule by mouth Daily, Disp: , Rfl:     ketoconazole (NIZORAL) 2 % cream, Apply 1 Application topically to the appropriate area as directed 2 (Two) Times a Day., Disp: 30 g, Rfl: 1    Lotemax 0.5 % ointment, APPLY RICE SIZE AMOUNT TO RIGHT LOWER EYELID AND LEFT UPPER EYELID TWICE A DAY, Disp: , Rfl:     sertraline (ZOLOFT) 50 MG tablet, Take 1 tablet by mouth Every Morning., Disp: , Rfl:     PEG-KCl-NaCl-NaSulf-Na Asc-C (MoviPrep) 100 g reconstituted solution powder, Take as directed by your Gastroenterologist. (Patient not taking: Reported on 3/19/2025), Disp: 2000 mL, Rfl: 0     Objective     Physical Exam  Vital Signs:   WDWN, Alert, NAD.    HEENT:  PERRL, EOMI.  OP, nares clear, no sinus tenderness  Neck:  Supple, no JVD, no thyromegaly.  Lymph: no axillary, cervical, supraclavicular lymphadenopathy noted bilaterally  Chest:  good aeration, clear to auscultation bilaterally, tympanic to percussion bilaterally, no work of breathing noted  CV: RRR, no MGR, pulses 2+, equal.  Abd:  Soft, NT, ND, + BS, no HSM  EXT:  no clubbing, no cyanosis, no edema, no joint tenderness  Neuro:  A&Ox3, CN grossly intact, no focal deficits.  Skin: No rashes or lesions noted.    /76 (BP Location: Right arm, Patient Position: Sitting, Cuff Size: Large Adult)   Pulse 76   Temp 98.4 °F (36.9 °C) (Oral)   Resp 16   Ht 170.2 cm (67.01\")   Wt 99.2 kg (218 lb 9.6 oz)   SpO2 97% Comment: room air  BMI 34.23 kg/m²         Result Review :   I have reviewed Dr. Lou's last office visit note.    Results  Laboratory Studies  Cologuard test was normal.      Procedures:           Assessment and Plan      Assessment:  Diagnoses and all orders for this visit:    1. Asthma, unspecified asthma severity, unspecified whether complicated, unspecified whether persistent (Primary)    2. Moderate persistent asthmatic bronchitis with acute exacerbation  -     albuterol sulfate  (90 Base) MCG/ACT inhaler; Inhale 2 puffs Every 4 (Four) " Hours As Needed for Wheezing.  Dispense: 18 g; Refill: 5         Assessment & Plan  1. Asthma.  Her pulmonary function is satisfactory. A prescription for a refill on albuterol inhaler to take as needed will be provided. She has been advised to inform us if the frequency of albuterol use increases to two or more times per week, or if she experiences nocturnal dyspnea, at which point a daily inhaler may be considered. She has also been instructed to report any changes in her condition that necessitate the use of albuterol as a rescue inhaler, rather than prophylactically, so that treatment can be escalated accordingly.    2. Surgical clearance.  Pulmonary clearance will be provided for her upcoming colonoscopy scheduled on 06/30/2025.  Patient is cleared with moderate risk.  Message has been sent to our DME coordinator to send over pulmonary clearance to Dr. Ace's office.    3.  Vaccination status: patient reports they are up-to-date with flu, pneumonia, and Covid vaccines.  Patient is advised to continue to follow CDC recommendations such as social distancing wearing a mask and washing hands for at least 20 seconds.    4.  Smoking status: Never smoker.    5.  Patient to call the office, 911, or go to the ER with new or worsening symptoms.    Follow-up  The patient will follow up in 1 year, or sooner if necessary.          Follow Up   Return for 1 year.  Patient was given instructions and counseling regarding her condition or for health maintenance advice. Please see specific information pulled into the AVS if appropriate.

## 2025-03-18 ENCOUNTER — OFFICE VISIT (OUTPATIENT)
Dept: FAMILY MEDICINE CLINIC | Facility: CLINIC | Age: 50
End: 2025-03-18
Payer: OTHER GOVERNMENT

## 2025-03-18 VITALS
OXYGEN SATURATION: 99 % | HEIGHT: 67 IN | BODY MASS INDEX: 34.29 KG/M2 | SYSTOLIC BLOOD PRESSURE: 120 MMHG | WEIGHT: 218.5 LBS | DIASTOLIC BLOOD PRESSURE: 82 MMHG | HEART RATE: 62 BPM | TEMPERATURE: 98.7 F

## 2025-03-18 DIAGNOSIS — R73.03 PREDIABETES: ICD-10-CM

## 2025-03-18 DIAGNOSIS — S46.002A INJURY OF LEFT ROTATOR CUFF, INITIAL ENCOUNTER: ICD-10-CM

## 2025-03-18 DIAGNOSIS — E66.9 OBESITY (BMI 30-39.9): ICD-10-CM

## 2025-03-18 DIAGNOSIS — F32.A DEPRESSION, UNSPECIFIED DEPRESSION TYPE: ICD-10-CM

## 2025-03-18 DIAGNOSIS — Z12.11 SCREENING FOR COLON CANCER: ICD-10-CM

## 2025-03-18 DIAGNOSIS — Z23 NEED FOR PNEUMOCOCCAL VACCINATION: ICD-10-CM

## 2025-03-18 DIAGNOSIS — E55.9 VITAMIN D DEFICIENCY: ICD-10-CM

## 2025-03-18 DIAGNOSIS — M25.512 CHRONIC LEFT SHOULDER PAIN: ICD-10-CM

## 2025-03-18 DIAGNOSIS — F98.8 ATTENTION DEFICIT DISORDER (ADD) IN ADULT: Primary | ICD-10-CM

## 2025-03-18 DIAGNOSIS — G89.29 CHRONIC LEFT SHOULDER PAIN: ICD-10-CM

## 2025-03-18 DIAGNOSIS — F41.9 ANXIETY: ICD-10-CM

## 2025-03-18 NOTE — PROGRESS NOTES
Chief Complaint  Prediabetes    Subjective          Maine Olguin presents to [facility]    History of Present Illness     History of Present Illness  The patient is a 49-year-old female who presents today for a follow-up visit.    She has been experiencing left shoulder pain since 2020, which has progressively worsened over the past 3 months. The pain is severe enough to hinder her ability to dress herself, particularly when putting on a bra. She describes the pain as shooting and suspects it may be related to a ligament issue. She reports no recent injury and believes the pain may be due to wear and tear. She is right-handed and first noticed the pain while backing up her car and turning the steering wheel. She is interested in seeing orthopedics depending on the results of the MRI. She prefers non-surgical interventions and is open to physical therapy. She has previously sought treatment from a physician at [place] and underwent physical therapy, but these interventions did not provide relief. No x-rays were taken during her previous treatment.    She was diagnosed with ADHD and was started on Adderall 2 weeks ago by her psychiatric mental health nurse practitioner. She reports feeling significantly better since starting the medication.    She is also on Zoloft for anxiety and depression.    She has been making lifestyle modifications for weight loss, including fitness activities, switching to sugar-free options, and intermittent fasting. She is not interested in trying Ozempic or Mounjaro at this time. She is happy that her labs are normal.    She has a colonoscopy scheduled for 06/2025 and a mammogram scheduled for next month.    She has been experiencing hot flashes, but they have lessened since she switched off of Wellbutrin.    She has not yet received her pneumococcal vaccine.    Supplemental Information  She had a dermatology referral done for her eyelid issues. The  "optometrist noted that her skin was so excoriated that it looked like burns and sent her to ophthalmology. The ophthalmologist thought it was contact dermatitis, so she went to allergy and had patch testing done. She started Lotemax and uses it as needed. She was seen at [facility] to rule out influenza, but she did not test positive.    SOCIAL HISTORY  She works at [employer].    MEDICATIONS  Current: Zoloft, Adderall, Lotemax    IMMUNIZATIONS  She is due for her pneumococcal vaccine.         Current Outpatient Medications   Medication Instructions    albuterol sulfate  (90 Base) MCG/ACT inhaler 2 puffs, Inhalation, Every 4 Hours PRN    amphetamine-dextroamphetamine XR (ADDERALL XR) 10 MG 24 hr capsule 10 mg, Daily    ketoconazole (NIZORAL) 2 % cream 1 Application, Topical, 2 Times Daily    Lotemax 0.5 % ointment APPLY RICE SIZE AMOUNT TO RIGHT LOWER EYELID AND LEFT UPPER EYELID TWICE A DAY    PEG-KCl-NaCl-NaSulf-Na Asc-C (MoviPrep) 100 g reconstituted solution powder Take as directed by your Gastroenterologist.    sertraline (ZOLOFT) 50 mg, Every Morning       The following portions of the patient's history were reviewed and updated as appropriate: allergies, current medications, past family history, past medical history, past social history, past surgical history, and problem list.    Objective   Vital Signs:   /82   Pulse 62   Temp 98.7 °F (37.1 °C) (Oral)   Ht 170.2 cm (67\")   Wt 99.1 kg (218 lb 8 oz)   SpO2 99%   BMI 34.22 kg/m²     BP Readings from Last 3 Encounters:   03/18/25 120/82   12/04/24 112/80   04/01/24 112/79     Wt Readings from Last 3 Encounters:   03/18/25 99.1 kg (218 lb 8 oz)   12/04/24 99.2 kg (218 lb 12.8 oz)   04/01/24 101 kg (223 lb)           Physical Exam  Vitals reviewed.   Constitutional:       Appearance: Normal appearance.   HENT:      Head: Normocephalic and atraumatic.      Right Ear: External ear normal.      Left Ear: External ear " normal.      Nose: Nose normal.   Eyes:      Conjunctiva/sclera: Conjunctivae normal.   Cardiovascular:      Rate and Rhythm: Normal rate and regular rhythm.      Heart sounds: No murmur heard.     No friction rub. No gallop.   Pulmonary:      Effort: Pulmonary effort is normal.      Breath sounds: Normal breath sounds. No wheezing or rhonchi.   Abdominal:      General: Bowel sounds are normal. There is no distension.      Palpations: Abdomen is soft.      Tenderness: There is no abdominal tenderness.   Musculoskeletal:      Comments: Left shoulder demonstrates positive empty can test with positive Winchester testing and negative external rotation testing.  Patient does have some restriction range of motion to active flexion and abduction.   Skin:     General: Skin is warm and dry.   Neurological:      Mental Status: She is alert and oriented to person, place, and time.      Cranial Nerves: No cranial nerve deficit.   Psychiatric:         Mood and Affect: Mood and affect normal.         Behavior: Behavior normal.         Thought Content: Thought content normal.         Judgment: Judgment normal.            Result Review :   The following data was reviewed by: Tanmay Mayberry DO on 03/18/2025:  Common labs          12/2/2024    07:55 3/13/2025    07:54   Common Labs   Glucose 88  87    BUN 11  15    Creatinine 0.96  0.95    Sodium 136  137    Potassium 4.0  4.2    Chloride 102  100    Calcium 9.1  9.7    Albumin 4.2  4.4    Total Bilirubin 0.4  0.5    Alkaline Phosphatase 86  97    AST (SGOT) 16  19    ALT (SGPT) 11  12    WBC 6.33  6.12    Hemoglobin 11.8  12.6    Hematocrit 37.6  38.5    Platelets 272  301    Total Cholesterol 202     Triglycerides 155     HDL Cholesterol 49     LDL Cholesterol  125     Hemoglobin A1C 6.10  5.90             Lab Results   Component Value Date    COVID19 NEGATIVE 05/17/2022    RAPSCRN Negative 05/03/2023    BILIRUBINUR Negative 05/18/2022       Results  Laboratory Studies  Blood counts  are normal, no anemia. Kidney function and liver enzymes are normal. FSH and LH levels are normal. C-reactive protein was slightly elevated at 0.91. NGUYEN level was negative. A1c was 5.9. Vitamin D levels improved.    Procedures        Assessment and Plan    Diagnoses and all orders for this visit:    1. Attention deficit disorder (ADD) in adult (Primary)    2. Anxiety    3. Depression, unspecified depression type    4. Prediabetes    5. Vitamin D deficiency    6. Screening for colon cancer    7. Obesity (BMI 30-39.9)    8. Need for pneumococcal vaccination    9. Chronic left shoulder pain  -     MRI Shoulder Left Without Contrast; Future    10. Injury of left rotator cuff, initial encounter  -     MRI Shoulder Left Without Contrast; Future        Assessment & Plan  1. Chronic left shoulder pain.  There is a concern for a potential rotator cuff injury. An MRI of the left shoulder will be ordered to further investigate the cause of the pain. She will be contacted with the appointment details. She declined the use of Voltaren gel, ibuprofen, or diclofenac at this time. Future treatment options may include an orthopedic referral or a shoulder injection.    2. ADHD.  She was diagnosed with ADHD and started on Adderall 2 weeks ago. She reports feeling much better on the medication.    3. Anxiety and depression.  She is currently taking Zoloft for both anxiety and depression.    4. Prediabetes.  Her A1c has improved to 5.9. She is advised to continue her current lifestyle modifications, including fitness and dietary changes.    5. Health maintenance.  Her blood counts, kidney function, and liver enzymes are normal. FSH and LH levels are normal, indicating she is not in the menopausal range. C-reactive protein is slightly elevated but has decreased from 0.93 to 0.91. NGUYEN level was negative. Vitamin D levels have improved. She will receive her pneumococcal vaccine today. A colonoscopy is scheduled for 06/2025, and a mammogram  is scheduled for next month.    6. Hot flashes.  She reports that her hot flashes have lessened since discontinuing Wellbutrin.    Follow-up  The patient will follow up in 3 months.       Medications Discontinued During This Encounter   Medication Reason    ALBUTEROL IN Duplicate order          Follow Up   Return in about 3 months (around 6/18/2025) for left shoulder pain.  Patient was given instructions and counseling regarding her condition or for health maintenance advice. Please see specific information pulled into the AVS if appropriate.     Patient or patient representative verbalized consent for the use of Ambient Listening during the visit with  Tanmay Mayberry DO for chart documentation. 3/18/2025  08:41 EDT    Tanmay Mayberry DO  03/18/25  09:02 EDT

## 2025-03-19 ENCOUNTER — TELEPHONE (OUTPATIENT)
Dept: PULMONOLOGY | Facility: CLINIC | Age: 50
End: 2025-03-19

## 2025-03-19 ENCOUNTER — OFFICE VISIT (OUTPATIENT)
Dept: PULMONOLOGY | Facility: CLINIC | Age: 50
End: 2025-03-19
Payer: OTHER GOVERNMENT

## 2025-03-19 VITALS
TEMPERATURE: 98.4 F | SYSTOLIC BLOOD PRESSURE: 112 MMHG | RESPIRATION RATE: 16 BRPM | WEIGHT: 218.6 LBS | OXYGEN SATURATION: 97 % | DIASTOLIC BLOOD PRESSURE: 76 MMHG | HEART RATE: 76 BPM | HEIGHT: 67 IN | BODY MASS INDEX: 34.31 KG/M2

## 2025-03-19 DIAGNOSIS — J45.41 MODERATE PERSISTENT ASTHMATIC BRONCHITIS WITH ACUTE EXACERBATION: ICD-10-CM

## 2025-03-19 DIAGNOSIS — J45.909 ASTHMA, UNSPECIFIED ASTHMA SEVERITY, UNSPECIFIED WHETHER COMPLICATED, UNSPECIFIED WHETHER PERSISTENT: Primary | ICD-10-CM

## 2025-03-19 LAB
S PN DA SERO 19F IGG SER IA-MCNC: <0.1 UG/ML
S PNEUM DA 1 IGG SER IA-MCNC: 0.7 UG/ML
S PNEUM DA 10A IGG SER IA-MCNC: 0.4 UG/ML
S PNEUM DA 11A IGG SER IA-MCNC: 0.1 UG/ML
S PNEUM DA 12F IGG SER IA-MCNC: <0.1 UG/ML
S PNEUM DA 14 IGG SER IA-MCNC: <0.1 UG/ML
S PNEUM DA 15B IGG SER IA-MCNC: 1.3 UG/ML
S PNEUM DA 17F IGG SER IA-MCNC: <0.1 UG/ML
S PNEUM DA 18C IGG SER IA-MCNC: <0.1 UG/ML
S PNEUM DA 19A IGG SER IA-MCNC: 0.3 UG/ML
S PNEUM DA 2 IGG SER IA-MCNC: 0.4 UG/ML
S PNEUM DA 20A IGG SER IA-MCNC: <0.2 UG/ML
S PNEUM DA 22F IGG SER IA-MCNC: <0.1 UG/ML
S PNEUM DA 23F IGG SER IA-MCNC: 0.2 UG/ML
S PNEUM DA 3 IGG SER IA-MCNC: <0.1 UG/ML
S PNEUM DA 33F IGG SER IA-MCNC: 1.1 UG/ML
S PNEUM DA 4 IGG SER IA-MCNC: <0.1 UG/ML
S PNEUM DA 5 IGG SER IA-MCNC: <0.1 UG/ML
S PNEUM DA 6B IGG SER IA-MCNC: <0.1 UG/ML
S PNEUM DA 7F IGG SER IA-MCNC: <0.1 UG/ML
S PNEUM DA 8 IGG SER IA-MCNC: <0.3 UG/ML
S PNEUM DA 9N IGG SER IA-MCNC: 0.3 UG/ML
S PNEUM DA 9V IGG SER IA-MCNC: 0.1 UG/ML

## 2025-03-19 PROCEDURE — 99214 OFFICE O/P EST MOD 30 MIN: CPT | Performed by: NURSE PRACTITIONER

## 2025-03-19 RX ORDER — ALBUTEROL SULFATE 90 UG/1
2 INHALANT RESPIRATORY (INHALATION) EVERY 4 HOURS PRN
Qty: 18 G | Refills: 5 | Status: SHIPPED | OUTPATIENT
Start: 2025-03-19

## 2025-03-19 NOTE — TELEPHONE ENCOUNTER
Patient needs pulmonary clearance for a colonoscopy with Dr. Ace on 6/30/2025. Patient is cleared with moderate risk. Please send over clearance. Thank you.

## 2025-04-23 ENCOUNTER — APPOINTMENT (OUTPATIENT)
Dept: MAMMOGRAPHY | Facility: HOSPITAL | Age: 50
End: 2025-04-23
Payer: OTHER GOVERNMENT

## 2025-04-23 ENCOUNTER — HOSPITAL ENCOUNTER (OUTPATIENT)
Dept: MAMMOGRAPHY | Facility: HOSPITAL | Age: 50
Discharge: HOME | End: 2025-04-23
Payer: OTHER GOVERNMENT

## 2025-04-23 ENCOUNTER — HOSPITAL ENCOUNTER (OUTPATIENT)
Dept: MRI IMAGING | Facility: HOSPITAL | Age: 50
Discharge: HOME | End: 2025-04-23
Payer: OTHER GOVERNMENT

## 2025-04-23 DIAGNOSIS — M25.512 CHRONIC LEFT SHOULDER PAIN: ICD-10-CM

## 2025-04-23 DIAGNOSIS — S46.002A INJURY OF LEFT ROTATOR CUFF, INITIAL ENCOUNTER: ICD-10-CM

## 2025-04-23 DIAGNOSIS — Z12.31 VISIT FOR SCREENING MAMMOGRAM: ICD-10-CM

## 2025-04-23 DIAGNOSIS — G89.29 CHRONIC LEFT SHOULDER PAIN: ICD-10-CM

## 2025-04-23 PROCEDURE — 77063 BREAST TOMOSYNTHESIS BI: CPT

## 2025-04-23 PROCEDURE — 73221 MRI JOINT UPR EXTREM W/O DYE: CPT

## 2025-04-23 PROCEDURE — 77067 SCR MAMMO BI INCL CAD: CPT

## 2025-05-07 ENCOUNTER — TELEPHONE (OUTPATIENT)
Dept: GASTROENTEROLOGY | Facility: CLINIC | Age: 50
End: 2025-05-07
Payer: OTHER GOVERNMENT

## 2025-05-07 NOTE — TELEPHONE ENCOUNTER
ENDO RECONCILIATION  Verify source of procedure(s): Nurse/MA screening  If other, please list source:     TIME OUT-CONFIRM CORRECT PROCEDURE: Colonoscopy  Cardiology:  Pulmonology: [facility]  Blood thinner:   GLP-1:  Additional DX/indication for procedure:     Please include any other notes relevant to endo reconciliation:     Pulmonary clearance requested on 3/13/25. Pt has an appt on 6/30/25.

## 2025-05-30 ENCOUNTER — TREATMENT (OUTPATIENT)
Dept: PHYSICAL THERAPY | Facility: CLINIC | Age: 50
End: 2025-05-30
Payer: OTHER GOVERNMENT

## 2025-05-30 DIAGNOSIS — R29.898 WEAKNESS OF LEFT SHOULDER: ICD-10-CM

## 2025-05-30 DIAGNOSIS — G44.86 CERVICOGENIC HEADACHE: ICD-10-CM

## 2025-05-30 DIAGNOSIS — M25.512 CHRONIC LEFT SHOULDER PAIN: Primary | ICD-10-CM

## 2025-05-30 DIAGNOSIS — G89.29 CHRONIC LEFT SHOULDER PAIN: Primary | ICD-10-CM

## 2025-05-30 DIAGNOSIS — M75.82 ROTATOR CUFF TENDINITIS, LEFT: ICD-10-CM

## 2025-05-30 PROCEDURE — 97535 SELF CARE MNGMENT TRAINING: CPT

## 2025-05-30 PROCEDURE — 97161 PT EVAL LOW COMPLEX 20 MIN: CPT

## 2025-05-30 PROCEDURE — 97110 THERAPEUTIC EXERCISES: CPT

## 2025-05-30 NOTE — PROGRESS NOTES
Physical Therapy Initial Evaluation and Plan of Care                       Patient: Maine Olguin   : 1975  Diagnosis/ICD-10 Code:  Chronic left shoulder pain [M25.512, G89.29]  Referring practitioner: Tanmay Mayberry DO  Date of Initial Visit: 2025  Today's Date: 2025  Patient seen for 1 sessions           Subjective Questionnaire: quickdash: 25      Subjective Evaluation    History of Present Illness  Mechanism of injury: Patient reports pain in the left shoulder and neck since  when she was doing pilates and tuning her head to the left caused a spasm and immediate neck pain. This resolved after a muscle relaxer but a few weeks later she had onset of left shoulder pain that exacerbated the neck pain. The shoulder pain has progressed intermittently since then creating pain and difficulty with OH movements, reaching, lifting, certain positions where her palm is down, reaching behind herself, and dressing. She has a history of headache/migraine on the left side of her head/neck/face but that has increased in frequency and severity since neck/shoulder symptoms began. She denies n/t or history of injury to the left shoulder. MRI showed tendinopathy of the supraspinatus, she has not had workup on the neck.     Pain  Quality: dull ache, discomfort, sharp, radiating and pulling  Relieving factors: medications  Aggravating factors: outstretched reach, repetitive movement, lifting, overhead activity and movement  Progression: no change    Hand dominance: right    Diagnostic Tests  MRI studies: abnormal    Treatments  Previous treatment: physical therapy  Current treatment: medication  Patient Goals  Patient goals for therapy: increased strength, decreased pain, increased motion, return to sport/leisure activities and independence with ADLs/IADLs             Objective          Palpation   Left   Tenderness of the suboccipitals and upper trapezius.     Tenderness   Cervical Spine   No tenderness in  the left 1st rib.     Active Range of Motion   Cervical/Thoracic Spine   Cervical    Flexion: 55 degrees   Extension: 40 degrees   Left lateral flexion: 24 degrees   Right lateral flexion: 28 degrees   Left rotation: 74 (w facial referral) degrees   Right rotation: 56 (w pulling on left) degrees   Left Shoulder   Flexion: 140 degrees   Abduction: 103 degrees   External rotation 0°: 28 degrees   Internal rotation BTB: L4     Passive Range of Motion   Left Shoulder   Flexion: 160 degrees with pain  Abduction: 110 degrees with pain  External rotation 0°: 40 degrees with pain    Joint Play   Left Shoulder  Joints within functional limits are the anterior capsule and AC joint. Hypomobile in the posterior capsule, inferior capsule, cervical spine and thoracic spine.    Strength/Myotome Testing     Left Shoulder     Planes of Motion   Flexion: 4-   Abduction: 3+   External rotation at 0°: 4   Internal rotation at 0°: 4     Isolated Muscles   Biceps: 4+   Triceps: 5     Tests     Left Shoulder   Positive empty can, full can, Hawkin's and Neer's.   Negative horn blower, lift-off and Speed's.      General Comments     Cervical/Thoracic Comments  Reproduction of parietal and facial symptoms with side glide at c2-c5 and suboccipitals  Restricted flexion of OA joint on left       See Exercise, Manual, and Modality Logs for complete treatment.     Assessment & Plan       Assessment  Impairments: abnormal muscle firing, abnormal muscle tone, abnormal or restricted ROM, activity intolerance, impaired physical strength, lacks appropriate home exercise program, pain with function and safety issue   Functional limitations: carrying objects, lifting, pulling, pushing, reaching behind back, reaching overhead and unable to perform repetitive tasks   Assessment details: The patient presents to physical therapy left shoulder  and neck pain with signs and symptoms consistent with RTC tendinitis on the left with accompanied cervicogenic  headaches. GH joint capsular restrictions also present.The patient presents with associated left shoulder pain, weakness stiffness, tenderness to palpation, and functional deficits (QuickDASH). The patient would benefit from skilled physical therapy as described below to address these limitations and allow the patient to return to her prior level of function.  Prognosis: good    Goals  Plan Goals: SHOULDER  PROBLEMS:     1. The patient has limited ROM of the left shoulder.    LTG 1: 12 weeks:  The patient will demonstrate 165 degrees of left shoulder flexion, 165 degrees of shoulder abduction, 80 degrees of shoulder external rotation, and 80 degrees of shoulder internal rotation to allow the patient to reach into upper kitchen cabinets and manipulate clothing behind the back with greater ease.    STATUS:  New   STG 1a: 6 weeks:  The patient will demonstrate 125 degrees of left shoulder flexion, 125 degrees of shoulder abduction, 70 degrees of shoulder external rotation, and 70 degrees of shoulder internal rotation.    STATUS:  New    2. The patient has limited strength of the left shoulder.   LTG 2: 12 weeks:  The patient will demonstrate 4+/5 strength for left shoulder flexion, abduction, external rotation, and internal rotation in order to demonstrate improved shoulder stability.    STATUS:  New   STG 2a: 8 weeks:  The patient will demonstrate 3+/5 strength for left shoulder flexion, abduction, external rotation, and internal rotation.    STATUS:  New   STG2b:  2 weeks:  The patient will be independent with home exercises.     STATUS:  New     3. The patient complains of pain to the left shoulder.   LTG 3: 12 weeks:  The patient will report a pain rating of 1/10 or better in order to improve sleep quality and tolerance to performance of activities of daily living.    STATUS:  New   STG 3a: 8 weeks:  The patient will report a pain rating of 3/10 or better.     STATUS:  New    4. Carrying, Moving, and Handling  Objects Functional Limitation     LTG 4: 12 weeks:  The patient will demonstrate 9.09 % limitation by achieving a score of 15/55 on the QuickDASH.    STATUS:  New   STG 4a: 6 weeks:  The patient will demonstrate 50 % limitation by achieving a score of 33/55 on the QuickDASH.      STATUS:  New    Plan  Therapy options: will be seen for skilled therapy services  Planned modality interventions: cryotherapy, electrical stimulation/Russian stimulation and TENS  Planned therapy interventions: ADL retraining, soft tissue mobilization, strengthening, stretching, therapeutic activities, joint mobilization, home exercise program, functional ROM exercises, flexibility, body mechanics training, postural training, neuromuscular re-education, manual therapy and motor coordination training  Frequency: 2x week  Duration in weeks: 12  Treatment plan discussed with: patient        Visit Diagnoses:    ICD-10-CM ICD-9-CM   1. Chronic left shoulder pain  M25.512 719.41    G89.29 338.29   2. Weakness of left shoulder  R29.898 781.99   3. Cervicogenic headache  G44.86 784.0   4. Rotator cuff tendinitis, left  M75.82 726.10       History # of Personal Factors and/or Comorbidities: LOW (0)  Examination of Body System(s): # of elements: LOW (1-2)  Clinical Presentation: STABLE   Clinical Decision Making: LOW       Timed:         Manual Therapy:    0     mins  40027;     Therapeutic Exercise:    30     mins  74251;     Neuromuscular Kaylan:    0    mins  72067;    Therapeutic Activity:     0     mins  26895;     Gait Trainin     mins  04236;     Ultrasound:     0     mins  66830;    Ionto                               0    mins   58798  Self Care                       10     mins   19822  Canalith Repos    0     mins 40643      Un-Timed:  Electrical Stimulation:    0     mins  90762 ( );  Dry Needling     0     mins self-pay  Traction     0     mins 77229  Low Eval     20     Mins  84900  Mod Eval     0     Mins  99774  High  Eval                       0     Mins  32486  Re-Eval                           0    mins  00257    Timed Treatment:   40   mins   Total Treatment:     60   mins    PT SIGNATURE: Anil Nguyễn PT    Electronically signed 5/30/2025    [place] License: PT - [id number]      Initial Certification  Certification Period: 5/30/2025 thru 8/27/2025  I certify that the therapy services are furnished while this patient is under my care.  The services outlined above are required by this patient, and will be reviewed every 90 days.     PHYSICIAN: Tanmay Mayberry DO  NPI: [id number]      DATE:     Please sign and return via fax to [phone]. Thank you, [facility].

## 2025-06-10 ENCOUNTER — TREATMENT (OUTPATIENT)
Dept: PHYSICAL THERAPY | Facility: CLINIC | Age: 50
End: 2025-06-10
Payer: OTHER GOVERNMENT

## 2025-06-10 DIAGNOSIS — M25.512 CHRONIC LEFT SHOULDER PAIN: Primary | ICD-10-CM

## 2025-06-10 DIAGNOSIS — M75.82 ROTATOR CUFF TENDINITIS, LEFT: ICD-10-CM

## 2025-06-10 DIAGNOSIS — G89.29 CHRONIC LEFT SHOULDER PAIN: Primary | ICD-10-CM

## 2025-06-10 DIAGNOSIS — R29.898 WEAKNESS OF LEFT SHOULDER: ICD-10-CM

## 2025-06-10 DIAGNOSIS — G44.86 CERVICOGENIC HEADACHE: ICD-10-CM

## 2025-06-10 PROCEDURE — 97530 THERAPEUTIC ACTIVITIES: CPT

## 2025-06-10 PROCEDURE — 97140 MANUAL THERAPY 1/> REGIONS: CPT

## 2025-06-10 NOTE — PROGRESS NOTES
Outpatient Physical Therapy                   Physical Therapy Daily Treatment Note    Patient: Maine Olguin   : 1975  Diagnosis/ICD-10 Code:  Chronic left shoulder pain [M25.512, G89.29]  Referring practitioner: Tanmay Mayberry DO  Date of Initial Visit: Type: THERAPY  Noted: 2025  Today's Date: 6/10/2025  Patient seen for 2 sessions             Subjective   Maine Olgiun reports: compliance with HEP although she is not getting them done as after as she would like.     Pain: 4/10 pain, located in the left shoulder.     Objective     See Exercise, Manual, and Modality Logs for complete treatment.     Assessment/Plan  Maine is just beginning care to attend to deficits outlined in evaluation, requiring further therapist directed strengthening. Doorway stretch for HEP was reviewed. Pt states she feels looser after manual therapy. Assess how patient tolerated treatment next session.    Progress per Plan of Care      Timed:  Manual Therapy:    12     mins  70443;  Therapeutic Exercise:    8     mins  67306;  Neuromuscular Kaylan:    0    mins  11104;  Therapeutic Activity:     10     mins  87626;  Self care:                       0     mins  96267;  Gait Trainin     mins  70262;  Ultrasound:                    0     mins  85047;    Untimed:  Mechanical Traction:    0     mins  72484;  Electrical Stimulation:    0     mins  28985 ( );      Timed Treatment:   30   mins  Total Treatment:     30   mins      Electronically signed:   Verna Gloria PTA  Physical Therapist Assistant  [facility] License #: [id number]

## 2025-06-13 ENCOUNTER — TREATMENT (OUTPATIENT)
Dept: PHYSICAL THERAPY | Facility: CLINIC | Age: 50
End: 2025-06-13
Payer: OTHER GOVERNMENT

## 2025-06-13 DIAGNOSIS — M25.512 CHRONIC LEFT SHOULDER PAIN: Primary | ICD-10-CM

## 2025-06-13 DIAGNOSIS — G44.86 CERVICOGENIC HEADACHE: ICD-10-CM

## 2025-06-13 DIAGNOSIS — G89.29 CHRONIC LEFT SHOULDER PAIN: Primary | ICD-10-CM

## 2025-06-13 DIAGNOSIS — M75.82 ROTATOR CUFF TENDINITIS, LEFT: ICD-10-CM

## 2025-06-13 DIAGNOSIS — R29.898 WEAKNESS OF LEFT SHOULDER: ICD-10-CM

## 2025-06-13 PROCEDURE — 97140 MANUAL THERAPY 1/> REGIONS: CPT

## 2025-06-13 PROCEDURE — 97110 THERAPEUTIC EXERCISES: CPT

## 2025-06-13 NOTE — PROGRESS NOTES
Physical Therapy Daily Treatment Note                          Patient: Maine Olguin   : 1975  Diagnosis/ICD-10 Code:  Chronic left shoulder pain [M25.512, G89.29]  Referring practitioner: Tanmay Mayberry DO  Date of Initial Visit: Type: THERAPY  Noted: 2025  Today's Date: 2025  Patient seen for 3 sessions           Subjective   The patient reported 6/10 pain today, she is having soreness in her pecs from pilates.     Objective   See Exercise, Manual, and Modality Logs for complete treatment.     Assessment/Plan     Patient tolerated today's treatment without complaints of pain. Improvements noted in ER following mobilization with movement. Strength, ROM, and increased pain with activity deficits still limits patient's ability to perform ADLs. Further skilled care indicated at this time.       Timed:  Manual Therapy:    12     mins  33439;  Therapeutic Exercise:    13     mins  39722;     Neuromuscular Kaylan:   0    mins  42318;    Therapeutic Activity:     0     mins  28458;     Gait Trainin     mins  88426;     Aquatics                         0      mins  21696    Un-timed:  Mechanical Traction      0     mins  38357  Dry Needling     0     mins self-pay  Electrical Stimulation:    0     mins  96809 ( );      Timed Treatment:   25   mins   Total Treatment:     25   mins    Anil Nguyễn PT  Physical Therapist    Electronically signed 2025    [place] License: PT - [id number]

## 2025-06-17 ENCOUNTER — TREATMENT (OUTPATIENT)
Dept: PHYSICAL THERAPY | Facility: CLINIC | Age: 50
End: 2025-06-17
Payer: OTHER GOVERNMENT

## 2025-06-17 DIAGNOSIS — R29.898 WEAKNESS OF LEFT SHOULDER: ICD-10-CM

## 2025-06-17 DIAGNOSIS — M25.512 CHRONIC LEFT SHOULDER PAIN: Primary | ICD-10-CM

## 2025-06-17 DIAGNOSIS — G44.86 CERVICOGENIC HEADACHE: ICD-10-CM

## 2025-06-17 DIAGNOSIS — G89.29 CHRONIC LEFT SHOULDER PAIN: Primary | ICD-10-CM

## 2025-06-17 DIAGNOSIS — M75.82 ROTATOR CUFF TENDINITIS, LEFT: ICD-10-CM

## 2025-06-17 PROCEDURE — 97530 THERAPEUTIC ACTIVITIES: CPT

## 2025-06-17 PROCEDURE — 97110 THERAPEUTIC EXERCISES: CPT

## 2025-06-17 NOTE — PROGRESS NOTES
Outpatient Physical Therapy                   Physical Therapy Daily Treatment Note    Patient: Maine Olguin   : 1975  Diagnosis/ICD-10 Code:  Chronic left shoulder pain [M25.512, G89.29]  Referring practitioner: Tanmay Mayberry DO  Date of Initial Visit: Type: THERAPY  Noted: 2025  Today's Date: 2025  Patient seen for 4 sessions             Subjective   Maine Olguin reports: she has had a lot more range of motion since last session and her pain has been a little less. Pt attributes it to the manual therapy; she thought it would be temporary but it actively has lasted. She has been able to reach behind her back better and the external rotation is improved.     Pain: 3/10 pain, at time of arrival.     Objective     See Exercise, Manual, and Modality Logs for complete treatment.     Assessment/Plan  Maine demonstrated good tolerance to all functional UE strengthening. Continue to progress with current PT plan of care per patient tolerance.       Progress per Plan of Care      Timed:  Manual Therapy:    9     mins  89596;  Therapeutic Exercise:    11     mins  30293;  Neuromuscular Kaylan:    0    mins  60370;  Therapeutic Activity:     10     mins  97378;  Self care:                       0     mins  66953;  Gait Trainin     mins  23349;  Ultrasound:                    0     mins  26254;    Untimed:  Mechanical Traction:    0     mins  89338;  Electrical Stimulation:    0     mins  06589 ( );      Timed Treatment:   30   mins  Total Treatment:     30   mins      Electronically signed:   Verna Gloria PTA  Physical Therapist Assistant  [facility] License #: [id number]

## 2025-06-19 ENCOUNTER — TREATMENT (OUTPATIENT)
Dept: PHYSICAL THERAPY | Facility: CLINIC | Age: 50
End: 2025-06-19
Payer: OTHER GOVERNMENT

## 2025-06-19 DIAGNOSIS — M75.82 ROTATOR CUFF TENDINITIS, LEFT: ICD-10-CM

## 2025-06-19 DIAGNOSIS — M25.512 CHRONIC LEFT SHOULDER PAIN: Primary | ICD-10-CM

## 2025-06-19 DIAGNOSIS — R29.898 WEAKNESS OF LEFT SHOULDER: ICD-10-CM

## 2025-06-19 DIAGNOSIS — G44.86 CERVICOGENIC HEADACHE: ICD-10-CM

## 2025-06-19 DIAGNOSIS — G89.29 CHRONIC LEFT SHOULDER PAIN: Primary | ICD-10-CM

## 2025-06-19 PROCEDURE — 97140 MANUAL THERAPY 1/> REGIONS: CPT

## 2025-06-19 PROCEDURE — 97110 THERAPEUTIC EXERCISES: CPT

## 2025-06-19 NOTE — PROGRESS NOTES
Physical Therapy Daily Treatment Note                          Patient: Maine Olguin   : 1975  Diagnosis/ICD-10 Code:  Chronic left shoulder pain [M25.512, G89.29]  Referring practitioner: Tanmay Mayberry DO  Date of Initial Visit: Type: THERAPY  Noted: 2025  Today's Date: 2025  Patient seen for 5 sessions           Subjective   The patient reported continued decrease in pain and improved mobility following sessions.     Objective   See Exercise, Manual, and Modality Logs for complete treatment.     Assessment/Plan     Patient tolerated today's treatment without complaints of pain. Improvements noted in ER ROM following mobilization with movement. Strength, ROM, and increased pain with activity deficits still limits patient's ability to perform ADLs. Further skilled care indicated at this time.       Timed:  Manual Therapy:    10     mins  09274;  Therapeutic Exercise:    20     mins  38380;     Neuromuscular Kaylan:   0    mins  35804;    Therapeutic Activity:     0     mins  23921;     Gait Trainin     mins  90829;     Aquatics                         0      mins  07226    Un-timed:  Mechanical Traction      0     mins  05214  Dry Needling     0     mins self-pay  Electrical Stimulation:    0     mins  49997 ( );      Timed Treatment:   30   mins   Total Treatment:     30   mins    Anil Nguyễn PT  Physical Therapist    Electronically signed 2025    [place] License: PT - [id number]

## 2025-06-19 NOTE — PAT
Reviewed the following with patient.    Arrival time of 0630.    Must have  over 18 for transportation home post procedure. Come to [facility], entrance C.     Education provided on laxative administration; bowel prep to be taken in two doses. Reviewed diet instructions for day prior to procedure. Only plain, unflavored water after midnight until two hours prior to arrival time.     Do not take any morning medications on the day of the procedure. Instead bring all prescribed medication and inhalers to the hospital the morning of the procedure. May take any nebulizer treatments or inhalers the morning of the procedure.     Plan to be here 3-4 hours.   Do not bring any valuables to hospital with you. Call Endo department for any questions.     Pt verbalized understanding of instructions.

## 2025-06-25 ENCOUNTER — TREATMENT (OUTPATIENT)
Dept: PHYSICAL THERAPY | Facility: CLINIC | Age: 50
End: 2025-06-25
Payer: OTHER GOVERNMENT

## 2025-06-25 DIAGNOSIS — G44.86 CERVICOGENIC HEADACHE: ICD-10-CM

## 2025-06-25 DIAGNOSIS — G89.29 CHRONIC LEFT SHOULDER PAIN: Primary | ICD-10-CM

## 2025-06-25 DIAGNOSIS — M75.82 ROTATOR CUFF TENDINITIS, LEFT: ICD-10-CM

## 2025-06-25 DIAGNOSIS — R29.898 WEAKNESS OF LEFT SHOULDER: ICD-10-CM

## 2025-06-25 DIAGNOSIS — M25.512 CHRONIC LEFT SHOULDER PAIN: Primary | ICD-10-CM

## 2025-06-25 PROCEDURE — 97140 MANUAL THERAPY 1/> REGIONS: CPT

## 2025-06-25 PROCEDURE — 97164 PT RE-EVAL EST PLAN CARE: CPT

## 2025-06-25 PROCEDURE — 97110 THERAPEUTIC EXERCISES: CPT

## 2025-06-25 NOTE — PROGRESS NOTES
Progress Note      Patient: Maine Olguin   : 1975  Diagnosis/ICD-10 Code:  Chronic left shoulder pain [M25.512, G89.29]  Referring practitioner: Tanmay Mayberry DO  Date of Initial Visit: Type: THERAPY  Noted: 2025  Today's Date: 2025  Patient seen for 6 sessions      Subjective:   Subjective Questionnaire: QuickDASH: 18  Clinical Progress: improved  Home Program Compliance: Yes  Treatment has included: therapeutic exercise, neuromuscular re-education, manual therapy, and therapeutic activity    Subjective   Patient reports overall improvement. She is having much less pain with the shoulder and improved motion making it easier to reach behind her back and overhead. There is still pain with lifting and she is still having neck stiffness and pain as well as headaches.   Objective   See Exercise, Manual, and Modality Logs for complete treatment.      Palpation   Left   Tenderness of the suboccipitals and upper trapezius.      Tenderness   Cervical Spine   No tenderness in the left 1st rib.      Active Range of Motion   Cervical/Thoracic Spine   Cervical     Flexion: 65 degrees   Extension: 60 degrees   Left lateral flexion: 20 degrees   Right lateral flexion: 30 degrees   Left rotation: 61 (w facial referral) degrees   Right rotation: 65 (w pulling on left) degrees     Left Shoulder   Flexion: 165 degrees   Abduction: 169 degrees   External rotation 0°: 55 degrees   Internal rotation BTB: WNL       Joint Play   Left Shoulder  Joints within functional limits are the anterior capsule and AC joint. Hypomobile in the posterior capsule, inferior capsule, cervical spine and thoracic spine.     Strength/Myotome Testing      Left Shoulder      Planes of Motion   Flexion: 4  Abduction: 4  External rotation at 0°: 4+   Internal rotation at 0°: 5     Isolated Muscles   Biceps: 4+   Triceps: 5      Tests      Left Shoulder   Positive empty can, full can, Hawkin's and Neer's.   Negative horn blower, lift-off  and Speed's.       General Comments      Cervical/Thoracic Comments  Reproduction of parietal and facial symptoms with side glide at c2-c5 and suboccipitals  Restricted flexion of OA joint on left  Assessment/Plan  Patient has progressed well since beginning skilled care. Significant improvement noted in both strength and ROM allowing for improved capacity to reach overhead and perform upper body dressing. Her shoulder has progressed very well, shifting some focus to neck over the next phase of POC.They are still limited in strength, ROM, endurance, stability and by increased pain which limit their ability to perform work at computer, checking her blind spot, driving, home care, lifting tasks, overhead tasks, and basic ADLs. Further skilled care indicated at this time to address remaining deficits.     Progress toward previous goals: Partially Met   Plan Goals: SHOULDER  PROBLEMS:      1. The patient has limited ROM of the left shoulder.                LTG 1: 12 weeks:  The patient will demonstrate 165 degrees of left shoulder flexion, 165 degrees of shoulder abduction, 80 degrees of shoulder external rotation, and 80 degrees of shoulder internal rotation to allow the patient to reach into upper kitchen cabinets and manipulate clothing behind the back with greater ease.                          STATUS:  progressing                STG 1a: 6 weeks:  The patient will demonstrate 125 degrees of left shoulder flexion, 125 degrees of shoulder abduction, 70 degrees of shoulder external rotation, and 70 degrees of shoulder internal rotation.                          STATUS:  met     2. The patient has limited strength of the left shoulder.              LTG 2: 12 weeks:  The patient will demonstrate 4+/5 strength for left shoulder flexion, abduction, external rotation, and internal rotation in order to demonstrate improved shoulder stability.                          STATUS:  progressing                STG 2a: 8 weeks:  The  patient will demonstrate 3+/5 strength for left shoulder flexion, abduction, external rotation, and internal rotation.                          STATUSmet              STG2b:  2 weeks:  The patient will be independent with home exercises.                           STATUSmet                3. The patient complains of pain to the left shoulder.              LTG 3: 12 weeks:  The patient will report a pain rating of 1/10 or better in order to improve sleep quality and tolerance to performance of activities of daily living.                          STATUS:  progressing                STG 3a: 8 weeks:  The patient will report a pain rating of 3/10 or better.                           STATUS:  progressing       4. Carrying, Moving, and Handling Objects Functional Limitation                               LTG 4: 12 weeks:  The patient will demonstrate 9.09 % limitation by achieving a score of 15/55 on the QuickDASH.                          STATUS:  progressing                STG 4a: 6 weeks:  The patient will demonstrate 50 % limitation by achieving a score of 33/55 on the QuickDASH.                            STATUS:  met   CERVICAL PROBLEMS    1. The patient has limited ROM.    LTG 1: 8 weeks:  The patient will demonstrate 65° of cervical spine rotation, 35° of cervical side bending, 50° of cervical flexion, and 70° of cervical extension in order to adequately monitor blind spots while driving and improve ability to perform activities of daily living.    STATUS:  New        Recommendations: Continue as planned  Timeframe: 6 weeks-decreased POC time  Prognosis to achieve goals: good    PT Signature: Anil Nguyễn PT    Electronically signed 2025    [place] License: PT - [id number]       Timed:  Manual Therapy:    12     mins  47982;  Therapeutic Exercise:    17     mins  89215;     Neuromuscular Kaylan:    0    mins  46833;    Therapeutic Activity:     0     mins  73234;     Gait Trainin     mins  63745;     Aquatics                          0      mins  47721    Un-timed:  Mechanical Traction      0     mins  37436  Dry Needling     0     mins self-pay  Electrical Stimulation:    0     mins  36012 ( );  5 minutes re-eval 01556  Timed Treatment:   29   mins   Total Treatment:     34   mins

## 2025-06-26 ENCOUNTER — TREATMENT (OUTPATIENT)
Dept: PHYSICAL THERAPY | Facility: CLINIC | Age: 50
End: 2025-06-26
Payer: OTHER GOVERNMENT

## 2025-06-26 DIAGNOSIS — M75.82 ROTATOR CUFF TENDINITIS, LEFT: ICD-10-CM

## 2025-06-26 DIAGNOSIS — G89.29 CHRONIC LEFT SHOULDER PAIN: Primary | ICD-10-CM

## 2025-06-26 DIAGNOSIS — G44.86 CERVICOGENIC HEADACHE: ICD-10-CM

## 2025-06-26 DIAGNOSIS — R29.898 WEAKNESS OF LEFT SHOULDER: ICD-10-CM

## 2025-06-26 DIAGNOSIS — M25.512 CHRONIC LEFT SHOULDER PAIN: Primary | ICD-10-CM

## 2025-06-26 NOTE — PROGRESS NOTES
Physical Therapy Daily Treatment Note  [facility] [address] [facility] [address]      Patient: Maine Olguin   : 1975  Diagnosis/ICD-10 Code:  Chronic left shoulder pain [M25.512, G89.29]  Referring practitioner: Tanmay Mayberry DO  Date of Initial Visit: Type: THERAPY  Noted: 2025  Encounter Date:  2025  Patient seen for 7 sessions           Subjective   Maine Olguin reported their L shoulder is a little sore today, ranking it as a 5/10.     Objective   See Exercise, Manual, and Modality Logs for complete treatment.     Assessment/Plan  Pt tolerates therapy session well today. Educated pt on deep neck flexor exs for improvement in control of cervical spine. Patient will continue to benefit from skilled physical therapy to further address their deficits in strength and ROM in order to improve upon their functional mobility and to return to ADLs w/o restrictions.          Timed:  Manual Therapy:    12     mins  89646;  Therapeutic Exercise:    18     mins  43732;     Neuromuscular Kaylan:   0    mins  55237;    Therapeutic Activity:     0     mins  53520;     Gait Trainin     mins  11580;     Aquatics                         0      mins  36635  Self Care                        0      mins  35182    Un-timed:  Mechanical Traction      0     mins  44114  Electrical Stimulation:    0     mins  15330 ( );      Timed Treatment:   30   mins   Total Treatment:     30   mins    Fernando Atkins PT, DPT    Electronically signed 2025    [place] License: PT - [id number]

## 2025-06-27 ENCOUNTER — ANESTHESIA EVENT (OUTPATIENT)
Dept: GASTROENTEROLOGY | Facility: HOSPITAL | Age: 50
End: 2025-06-27
Payer: OTHER GOVERNMENT

## 2025-06-27 NOTE — ANESTHESIA PREPROCEDURE EVALUATION
Anesthesia Evaluation     Nursing notes reviewed   NPO Solid Status: > 8 hours  NPO Liquid Status: > 4 hours           Airway   Mallampati: I  TM distance: >3 FB  Neck ROM: full  No difficulty expected  Dental - normal exam     Pulmonary - normal exam    breath sounds clear to auscultation  (+) asthma (1 month ago inhaler use),  Cardiovascular - normal exam  Exercise tolerance: good (4-7 METS)    Rhythm: regular  Rate: normal    (+) hyperlipidemia    ROS comment: No EKG on file    Neuro/Psych  (+) psychiatric history Anxiety, Depression and ADHD  GI/Hepatic/Renal/Endo - negative ROS     Musculoskeletal (-) negative ROS    Abdominal     Abdomen: soft.   Substance History - negative use     OB/GYN      Comment: S/P hysterectomy      Other - negative ROS       ROS/Med Hx Other: Pulmonary clearance 5/2/25, cleared with moderate risk                 Anesthesia Plan    ASA 2     general   total IV anesthesia  (Total IV Anesthesia    Patient understands anesthesia not responsible for dental damage.  )  intravenous induction     Anesthetic plan, risks, benefits, and alternatives have been provided, discussed and informed consent has been obtained with: patient.  Pre-procedure education provided  Plan discussed with CRNA.      CODE STATUS:

## 2025-06-30 ENCOUNTER — HOSPITAL ENCOUNTER (OUTPATIENT)
Facility: HOSPITAL | Age: 50
Setting detail: HOSPITAL OUTPATIENT SURGERY
Discharge: HOME | End: 2025-06-30
Attending: INTERNAL MEDICINE | Admitting: INTERNAL MEDICINE
Payer: OTHER GOVERNMENT

## 2025-06-30 ENCOUNTER — ANESTHESIA (OUTPATIENT)
Dept: GASTROENTEROLOGY | Facility: HOSPITAL | Age: 50
End: 2025-06-30
Payer: OTHER GOVERNMENT

## 2025-06-30 VITALS
HEIGHT: 67 IN | HEART RATE: 58 BPM | OXYGEN SATURATION: 100 % | DIASTOLIC BLOOD PRESSURE: 72 MMHG | TEMPERATURE: 98 F | BODY MASS INDEX: 33.49 KG/M2 | WEIGHT: 213.41 LBS | SYSTOLIC BLOOD PRESSURE: 123 MMHG | RESPIRATION RATE: 14 BRPM

## 2025-06-30 DIAGNOSIS — Z12.11 SCREENING FOR MALIGNANT NEOPLASM OF COLON: ICD-10-CM

## 2025-06-30 DIAGNOSIS — Z80.0 FAMILY HISTORY OF COLON CANCER: ICD-10-CM

## 2025-06-30 PROCEDURE — 25010000002 LIDOCAINE PF 2% 2 % SOLUTION: Performed by: NURSE ANESTHETIST, CERTIFIED REGISTERED

## 2025-06-30 PROCEDURE — 25810000003 LACTATED RINGERS PER 1000 ML: Performed by: NURSE ANESTHETIST, CERTIFIED REGISTERED

## 2025-06-30 PROCEDURE — 88305 TISSUE EXAM BY PATHOLOGIST: CPT | Performed by: INTERNAL MEDICINE

## 2025-06-30 PROCEDURE — 25010000002 PROPOFOL 10 MG/ML EMULSION: Performed by: NURSE ANESTHETIST, CERTIFIED REGISTERED

## 2025-06-30 RX ORDER — PROPOFOL 10 MG/ML
VIAL (ML) INTRAVENOUS AS NEEDED
Status: DISCONTINUED | OUTPATIENT
Start: 2025-06-30 | End: 2025-06-30 | Stop reason: SURG

## 2025-06-30 RX ORDER — SODIUM CHLORIDE, SODIUM LACTATE, POTASSIUM CHLORIDE, CALCIUM CHLORIDE 600; 310; 30; 20 MG/100ML; MG/100ML; MG/100ML; MG/100ML
30 INJECTION, SOLUTION INTRAVENOUS CONTINUOUS
Status: DISCONTINUED | OUTPATIENT
Start: 2025-06-30 | End: 2025-06-30 | Stop reason: HOSPADM

## 2025-06-30 RX ORDER — LIDOCAINE HYDROCHLORIDE 20 MG/ML
INJECTION, SOLUTION EPIDURAL; INFILTRATION; INTRACAUDAL; PERINEURAL AS NEEDED
Status: DISCONTINUED | OUTPATIENT
Start: 2025-06-30 | End: 2025-06-30 | Stop reason: SURG

## 2025-06-30 RX ADMIN — LIDOCAINE HYDROCHLORIDE 40 MG: 20 INJECTION, SOLUTION INTRAVENOUS at 07:58

## 2025-06-30 RX ADMIN — SODIUM CHLORIDE, POTASSIUM CHLORIDE, SODIUM LACTATE AND CALCIUM CHLORIDE 30 ML/HR: 600; 310; 30; 20 INJECTION, SOLUTION INTRAVENOUS at 07:15

## 2025-06-30 RX ADMIN — PROPOFOL 100 MG: 10 INJECTION, EMULSION INTRAVENOUS at 07:58

## 2025-06-30 RX ADMIN — PROPOFOL 250 MCG/KG/MIN: 10 INJECTION, EMULSION INTRAVENOUS at 07:58

## 2025-06-30 NOTE — H&P
Pre Procedure History & Physical    Chief Complaint:   Screening colonoscopy    Subjective     HPI:   51 yo F here for screening colonoscopy.    Past Medical History:   Past Medical History:   Diagnosis Date    ADHD (attention deficit hyperactivity disorder), inattentive type 2025    Anxiety 2017    Asthma        Past Surgical History:  Past Surgical History:   Procedure Laterality Date     SECTION      HYSTERECTOMY      TONSILLECTOMY         Family History:  Family History   Problem Relation Age of Onset    Depression Mother     Diabetes Mother     Stroke Mother     Vision loss Mother     Cancer Father     Hyperlipidemia Father     Depression Maternal Grandmother     Diabetes Maternal Grandmother     Stroke Maternal Grandmother     Colon cancer Paternal Grandmother     Alcohol abuse Paternal Grandfather        Social History:   reports that she has never smoked. She has never been exposed to tobacco smoke. She has never used smokeless tobacco. She reports that she does not currently use alcohol. She reports that she does not use drugs.    Medications:   Medications Prior to Admission   Medication Sig Dispense Refill Last Dose/Taking    amphetamine-dextroamphetamine XR (ADDERALL XR) 10 MG 24 hr capsule Take 1 capsule by mouth Daily   Past Week    sertraline (ZOLOFT) 50 MG tablet Take 1 tablet by mouth Every Morning.   Past Week    albuterol sulfate  (90 Base) MCG/ACT inhaler Inhale 2 puffs Every 4 (Four) Hours As Needed for Wheezing. 18 g 5 Unknown    ketoconazole (NIZORAL) 2 % cream Apply 1 Application topically to the appropriate area as directed 2 (Two) Times a Day. 30 g 1     Lotemax 0.5 % ointment APPLY RICE SIZE AMOUNT TO RIGHT LOWER EYELID AND LEFT UPPER EYELID TWICE A DAY   Unknown       Allergies:  Patient has no known allergies.    ROS:    Pertinent items are noted in HPI     Objective     Blood pressure 117/76, pulse 68, temperature 98.6 °F (37 °C), temperature source  "Temporal, resp. rate 14, height 170.2 cm (67\"), weight 96.8 kg (213 lb 6.5 oz), SpO2 100%, not currently breastfeeding.    Physical Exam   Constitutional: Pt is oriented to person, place, and time and well-developed, well-nourished, and in no distress.   Mouth/Throat: Oropharynx is clear and moist.   Neck: Normal range of motion.   Cardiovascular: Normal rate, regular rhythm and normal heart sounds.    Pulmonary/Chest: Effort normal and breath sounds normal.   Abdominal: Soft. Nontender  Skin: Skin is warm and dry.   Psychiatric: Mood, memory, affect and judgment normal.     Assessment & Plan     Diagnosis:  Screening colonoscopy    Anticipated Surgical Procedure:  Colonoscopy    The risks, benefits, and alternatives of this procedure have been discussed with the patient or the responsible party- the patient understands and agrees to proceed.           "

## 2025-06-30 NOTE — ANESTHESIA POSTPROCEDURE EVALUATION
Patient: Maine Olguin    Procedure Summary       Date: 06/30/25 Room / Location: [facility] ENDOSCOPY 2 / [facility] ENDOSCOPY    Anesthesia Start: 0757 Anesthesia Stop: 0820    Procedure: COLONOSCOPY Diagnosis:       Screening for malignant neoplasm of colon      Family history of colon cancer      (Screening for malignant neoplasm of colon [Z12.11])      (Family history of colon cancer [Z80.0])    Surgeons: Mary Ace MD Provider: Rosalind Patel CRNA    Anesthesia Type: general ASA Status: 2            Anesthesia Type: general    Vitals  Vitals Value Taken Time   /75 06/30/25 08:30   Temp 36.7 °C (98 °F) 06/30/25 08:20   Pulse 73 06/30/25 08:32   Resp 14 06/30/25 08:30   SpO2 100 % 06/30/25 08:32   Vitals shown include unfiled device data.        Post Anesthesia Care and Evaluation    Patient location during evaluation: bedside  Patient participation: complete - patient participated  Level of consciousness: awake  Pain management: adequate    Airway patency: patent  Anesthetic complications: No anesthetic complications  PONV Status: controlled  Cardiovascular status: acceptable and stable  Respiratory status: acceptable

## 2025-07-01 ENCOUNTER — RESULTS FOLLOW-UP (OUTPATIENT)
Dept: GASTROENTEROLOGY | Facility: HOSPITAL | Age: 50
End: 2025-07-01
Payer: OTHER GOVERNMENT

## 2025-07-01 ENCOUNTER — TREATMENT (OUTPATIENT)
Dept: PHYSICAL THERAPY | Facility: CLINIC | Age: 50
End: 2025-07-01
Payer: OTHER GOVERNMENT

## 2025-07-01 DIAGNOSIS — G44.86 CERVICOGENIC HEADACHE: ICD-10-CM

## 2025-07-01 DIAGNOSIS — G89.29 CHRONIC LEFT SHOULDER PAIN: Primary | ICD-10-CM

## 2025-07-01 DIAGNOSIS — M25.512 CHRONIC LEFT SHOULDER PAIN: Primary | ICD-10-CM

## 2025-07-01 DIAGNOSIS — M75.82 ROTATOR CUFF TENDINITIS, LEFT: ICD-10-CM

## 2025-07-01 DIAGNOSIS — R29.898 WEAKNESS OF LEFT SHOULDER: ICD-10-CM

## 2025-07-01 LAB
CYTO UR: NORMAL
LAB AP CASE REPORT: NORMAL
LAB AP CLINICAL INFORMATION: NORMAL
PATH REPORT.FINAL DX SPEC: NORMAL
PATH REPORT.GROSS SPEC: NORMAL

## 2025-07-01 PROCEDURE — 97140 MANUAL THERAPY 1/> REGIONS: CPT | Performed by: PHYSICAL THERAPIST

## 2025-07-01 PROCEDURE — 97110 THERAPEUTIC EXERCISES: CPT | Performed by: PHYSICAL THERAPIST

## 2025-07-01 NOTE — PROGRESS NOTES
Outpatient Physical Therapy  [address] [facility], [place] [address]                            Physical Therapy Daily Treatment Note    Patient: Maine Olguin   : 1975  Diagnosis/ICD-10 Code:  Chronic left shoulder pain [M25.512, G89.29]  Referring practitioner: Tanmay Mayberry DO  Date of Initial Visit: Type: THERAPY  Noted: 2025  Today's Date: 2025  Patient seen for 8 sessions           Subjective   Maine Olguin reports: that overall the shoulder is doing better, she had a colonoscopy yesterday and feels like the anesthesia and rest from that helped.     Objective   Discomfort with prone Ts.    See Exercise, Manual, and Modality Logs for complete treatment.     Assessment/Plan  Maine progressing as evident by decreased overall L shoulder and neck pain. Pt tolerated exercises and manual well, relief with manual therapy. Pt would benefit from skilled PT to address Range of Motion  and Strength deficits, pain management and any concerns with ADLs.       Progress per Plan of Care         Timed:  Manual Therapy:    15     mins  80460;  Therapeutic Exercise:    15     mins  04591;     Neuromuscular Kaylan:        mins  17528;    Therapeutic Activity:          mins  27022;     Gait Training:           mins  09004;      Untimed:  Electrical Stimulation:         mins  75481 (MC );  Mechanical Traction:         mins  60922;     Timed Treatment:   30   mins   Total Treatment:     30   mins      Electronically signed:     Rosie Forman PTA , CSRS  Physical Therapist Assistant  Certified Stroke Rehabilitation Specialist    [place] [employer] License #: [id number]

## 2025-07-01 NOTE — LETTER
July 2, 2025    Maine Olguin  [address]  [place] [address]    7/2/2025         Maine JAS Clau   [address] [place] [address]            Dear Maine Ace MD performed a(n) Colonoscopy on June 30, 2025; Your biopsy results were benign.  If applicable, please refer to the pathology and/or procedure report for more detailed information via MyChart or by obtaining a copy of the reports from our office. We recommend that you have a repeat Colonoscopy in 5 years.    A colonoscopy is the best way to screen for colon polyps and colon cancer, however despite careful evaluation, small polyps can sometimes be missed. If you should develop any bleeding, abdominal pain, weight loss, change in bowel habits, or any other GI complications, please inform our office as soon as possible.      Additionally, Mary Ace MD recommends you adopt the following healthy habits to lower your risk of future polyps and colon cancer:    Exercise regularly.  Do not smoke or consume alcohol.  Limit red meat intake.  Include ample fruits and vegetables in your diet.    If you have any questions regarding your procedure or results, please do not hesitate to contact our office.    Sincerely,        Gastroenterology [facility] Giron, APRN

## 2025-07-03 ENCOUNTER — TREATMENT (OUTPATIENT)
Dept: PHYSICAL THERAPY | Facility: CLINIC | Age: 50
End: 2025-07-03
Payer: OTHER GOVERNMENT

## 2025-07-03 DIAGNOSIS — G89.29 CHRONIC LEFT SHOULDER PAIN: Primary | ICD-10-CM

## 2025-07-03 DIAGNOSIS — M25.512 CHRONIC LEFT SHOULDER PAIN: Primary | ICD-10-CM

## 2025-07-03 DIAGNOSIS — G44.86 CERVICOGENIC HEADACHE: ICD-10-CM

## 2025-07-03 DIAGNOSIS — R29.898 WEAKNESS OF LEFT SHOULDER: ICD-10-CM

## 2025-07-03 DIAGNOSIS — M75.82 ROTATOR CUFF TENDINITIS, LEFT: ICD-10-CM

## 2025-07-03 PROCEDURE — 97110 THERAPEUTIC EXERCISES: CPT | Performed by: PHYSICAL THERAPIST

## 2025-07-03 PROCEDURE — 97140 MANUAL THERAPY 1/> REGIONS: CPT | Performed by: PHYSICAL THERAPIST

## 2025-07-03 NOTE — PROGRESS NOTES
Outpatient Physical Therapy  [address] [facility], [place] [address]                            Physical Therapy Daily Treatment Note    Patient: Maine Olguin   : 1975  Diagnosis/ICD-10 Code:  Chronic left shoulder pain [M25.512, G89.29]  Referring practitioner: Tanmay Mayberry DO  Date of Initial Visit: Type: THERAPY  Noted: 2025  Today's Date: 7/3/2025  Patient seen for 9 sessions           Subjective   Maine Olguin reports: overall she is doing better, states that the shoulder is stiff because she sat at a [facility] island for a while.     Objective   General fatigue.    See Exercise, Manual, and Modality Logs for complete treatment.     Assessment/Plan  Maine progressing as evident by decreased overall neck and L shoulder pain. Pt tolerated exercises and manual well, just general fatigue. Pt would benefit from skilled PT to address Range of Motion  and Strength deficits, pain management and any concerns with ADLs.       Progress per Plan of Care         Timed:  Manual Therapy:    15     mins  13864;  Therapeutic Exercise:    15     mins  55989;     Neuromuscular Kaylan:        mins  60594;    Therapeutic Activity:          mins  43418;     Gait Training:           mins  52153;      Untimed:  Electrical Stimulation:         mins  07379 ( );  Mechanical Traction:         mins  57458;     Timed Treatment:   30   mins   Total Treatment:     30   mins      Electronically signed:     Rosie Forman PTA , CSRS  Physical Therapist Assistant  Certified Stroke Rehabilitation Specialist    [facility] License #: [id number]

## 2025-07-07 ENCOUNTER — TREATMENT (OUTPATIENT)
Dept: PHYSICAL THERAPY | Facility: CLINIC | Age: 50
End: 2025-07-07
Payer: OTHER GOVERNMENT

## 2025-07-07 DIAGNOSIS — G89.29 CHRONIC LEFT SHOULDER PAIN: Primary | ICD-10-CM

## 2025-07-07 DIAGNOSIS — M25.512 CHRONIC LEFT SHOULDER PAIN: Primary | ICD-10-CM

## 2025-07-07 DIAGNOSIS — R29.898 WEAKNESS OF LEFT SHOULDER: ICD-10-CM

## 2025-07-07 DIAGNOSIS — M75.82 ROTATOR CUFF TENDINITIS, LEFT: ICD-10-CM

## 2025-07-07 DIAGNOSIS — G44.86 CERVICOGENIC HEADACHE: ICD-10-CM

## 2025-07-07 PROCEDURE — 97110 THERAPEUTIC EXERCISES: CPT

## 2025-07-07 PROCEDURE — 97140 MANUAL THERAPY 1/> REGIONS: CPT

## 2025-07-07 NOTE — PROGRESS NOTES
Physical Therapy Daily Treatment Note  [facility] [address] [facility] [place] [address]      Patient: Maine Olguin   : 1975  Diagnosis/ICD-10 Code:  Chronic left shoulder pain [M25.512, G89.29]  Referring practitioner: Tanmay Mayberry DO  Date of Initial Visit: Type: THERAPY  Noted: 2025  Encounter Date:  2025  Patient seen for 10 sessions           Subjective   Maine Olguin reported they are doing well today. Pt reports their L shoulder pain and cervical pain is rated as a 3/10 today.     Objective   See Exercise, Manual, and Modality Logs for complete treatment.     Assessment/Plan  Pt tolerates therapy session well. Pt is progressing, as they are having decreased difficulty w/ exs and decreased pain. Patient will continue to benefit from skilled physical therapy to further address their deficits in strength and ROM in order to improve upon their functional mobility and to return to ADLs w/o restrictions.          Timed:  Manual Therapy:    15     mins  79514;  Therapeutic Exercise:    15     mins  96761;     Neuromuscular Kaylan:   0    mins  28695;    Therapeutic Activity:     0     mins  95427;     Gait Trainin     mins  19971;     Aquatics                         0      mins  94230  Self Care                        0      mins  56518    Un-timed:  Mechanical Traction      0     mins  18027  Electrical Stimulation:    0     mins  26100 ( );      Timed Treatment:   30   mins   Total Treatment:     30   mins    Fernando Atkins PT, DPT    Electronically signed 2025    [place] License: PT - [id number]

## 2025-07-10 ENCOUNTER — TREATMENT (OUTPATIENT)
Dept: PHYSICAL THERAPY | Facility: CLINIC | Age: 50
End: 2025-07-10
Payer: OTHER GOVERNMENT

## 2025-07-10 DIAGNOSIS — G44.86 CERVICOGENIC HEADACHE: ICD-10-CM

## 2025-07-10 DIAGNOSIS — M75.82 ROTATOR CUFF TENDINITIS, LEFT: ICD-10-CM

## 2025-07-10 DIAGNOSIS — R29.898 WEAKNESS OF LEFT SHOULDER: ICD-10-CM

## 2025-07-10 DIAGNOSIS — M25.512 CHRONIC LEFT SHOULDER PAIN: Primary | ICD-10-CM

## 2025-07-10 DIAGNOSIS — G89.29 CHRONIC LEFT SHOULDER PAIN: Primary | ICD-10-CM

## 2025-07-10 PROCEDURE — 97140 MANUAL THERAPY 1/> REGIONS: CPT

## 2025-07-10 PROCEDURE — 97110 THERAPEUTIC EXERCISES: CPT

## 2025-07-10 NOTE — PROGRESS NOTES
Physical Therapy Daily Treatment Note                          Patient: Maine Olguin   : 1975  Diagnosis/ICD-10 Code:  Chronic left shoulder pain [M25.512, G89.29]  Referring practitioner: Tanmay Mayberry DO  Date of Initial Visit: Type: THERAPY  Noted: 2025  Today's Date: 7/10/2025  Patient seen for 11 sessions           Subjective   The patient reported overall improvement and less headaches, she feels she will be ready to transition to home program after her  remaining visits.     Objective   See Exercise, Manual, and Modality Logs for complete treatment.     Assessment/Plan     Patient tolerated today's treatment without complaints of pain. Added cervical AROM head off bed to HEP. She will be able to discharge following her remaining visits to home program.       Timed:  Manual Therapy:    10     mins  62271;  Therapeutic Exercise:    20     mins  49739;     Neuromuscular Kaylan:   0    mins  88375;    Therapeutic Activity:     0     mins  10651;     Gait Trainin     mins  03467;     Aquatics                         0      mins  31680    Un-timed:  Mechanical Traction      0     mins  49952  Dry Needling     0     mins self-pay  Electrical Stimulation:    0     mins  69455 ( );      Timed Treatment:   30   mins   Total Treatment:     30   mins    Anil Nguyễn PT  Physical Therapist    Electronically signed 7/10/2025    [place] License: PT - [id number]

## 2025-07-15 ENCOUNTER — TREATMENT (OUTPATIENT)
Dept: PHYSICAL THERAPY | Facility: CLINIC | Age: 50
End: 2025-07-15
Payer: OTHER GOVERNMENT

## 2025-07-15 DIAGNOSIS — M75.82 ROTATOR CUFF TENDINITIS, LEFT: ICD-10-CM

## 2025-07-15 DIAGNOSIS — G44.86 CERVICOGENIC HEADACHE: ICD-10-CM

## 2025-07-15 DIAGNOSIS — R29.898 WEAKNESS OF LEFT SHOULDER: ICD-10-CM

## 2025-07-15 DIAGNOSIS — M25.512 CHRONIC LEFT SHOULDER PAIN: Primary | ICD-10-CM

## 2025-07-15 DIAGNOSIS — G89.29 CHRONIC LEFT SHOULDER PAIN: Primary | ICD-10-CM

## 2025-07-15 NOTE — PROGRESS NOTES
Outpatient Physical Therapy  [address] [facility], [place] [address]                            Physical Therapy Daily Treatment Note    Patient: Maine Olguin   : 1975  Diagnosis/ICD-10 Code:  Chronic left shoulder pain [M25.512, G89.29]  Referring practitioner: Tanmay Mayberry DO  Date of Initial Visit: Type: THERAPY  Noted: 2025  Today's Date: 7/15/2025  Patient seen for 12 sessions           Subjective   Maine Olguin reports: that she sat at her computer a lot yesterday and felt like the neck really hurt worse yesterday. Better after some rest last night.     Objective   Tender L Cervical Unilateral Posterior Anterior Mobs.     See Exercise, Manual, and Modality Logs for complete treatment.     Assessment/Plan  Maine still experiencing increased L shoulder pain, especially after lots of computer work yesterday. Pt experienced relief  with manual therapy. Pt would benefit from skilled PT to address Range of Motion  and Strength deficits, pain management and any concerns with ADLs.       Progress per Plan of Care         Timed:  Manual Therapy:    15     mins  75348;  Therapeutic Exercise:    15     mins  88260;     Neuromuscular Kaylan:        mins  78996;    Therapeutic Activity:          mins  21704;     Gait Training:           mins  03304;      Untimed:  Electrical Stimulation:         mins  24248 ( );  Mechanical Traction:         mins  41277;     Timed Treatment:   30   mins   Total Treatment:     30   mins      Electronically signed:     Rosie Forman PTA , CSRS  Physical Therapist Assistant  Certified Stroke Rehabilitation Specialist    [place] PTA License #: [id number]

## 2025-07-16 ENCOUNTER — TREATMENT (OUTPATIENT)
Dept: PHYSICAL THERAPY | Facility: CLINIC | Age: 50
End: 2025-07-16
Payer: OTHER GOVERNMENT

## 2025-07-16 ENCOUNTER — LAB (OUTPATIENT)
Facility: HOSPITAL | Age: 50
End: 2025-07-16
Payer: OTHER GOVERNMENT

## 2025-07-16 ENCOUNTER — OFFICE VISIT (OUTPATIENT)
Dept: FAMILY MEDICINE CLINIC | Facility: CLINIC | Age: 50
End: 2025-07-16
Payer: OTHER GOVERNMENT

## 2025-07-16 VITALS
HEIGHT: 67 IN | OXYGEN SATURATION: 99 % | HEART RATE: 88 BPM | SYSTOLIC BLOOD PRESSURE: 128 MMHG | BODY MASS INDEX: 33.98 KG/M2 | WEIGHT: 216.5 LBS | DIASTOLIC BLOOD PRESSURE: 86 MMHG | TEMPERATURE: 98.1 F

## 2025-07-16 DIAGNOSIS — F41.9 ANXIETY: ICD-10-CM

## 2025-07-16 DIAGNOSIS — M54.2 CHRONIC NECK PAIN: ICD-10-CM

## 2025-07-16 DIAGNOSIS — G89.29 CHRONIC NECK PAIN: ICD-10-CM

## 2025-07-16 DIAGNOSIS — R29.898 WEAKNESS OF LEFT SHOULDER: ICD-10-CM

## 2025-07-16 DIAGNOSIS — R73.03 PREDIABETES: ICD-10-CM

## 2025-07-16 DIAGNOSIS — E55.9 VITAMIN D DEFICIENCY: ICD-10-CM

## 2025-07-16 DIAGNOSIS — R10.84 GENERALIZED ABDOMINAL PAIN: ICD-10-CM

## 2025-07-16 DIAGNOSIS — Z91.018 FOOD ALLERGY: ICD-10-CM

## 2025-07-16 DIAGNOSIS — G89.29 CHRONIC LEFT SHOULDER PAIN: Primary | ICD-10-CM

## 2025-07-16 DIAGNOSIS — M25.512 CHRONIC LEFT SHOULDER PAIN: ICD-10-CM

## 2025-07-16 DIAGNOSIS — F32.A DEPRESSION, UNSPECIFIED DEPRESSION TYPE: ICD-10-CM

## 2025-07-16 DIAGNOSIS — M25.512 CHRONIC LEFT SHOULDER PAIN: Primary | ICD-10-CM

## 2025-07-16 DIAGNOSIS — R19.5 CLAY-COLORED STOOLS: ICD-10-CM

## 2025-07-16 DIAGNOSIS — M75.82 ROTATOR CUFF TENDINITIS, LEFT: ICD-10-CM

## 2025-07-16 DIAGNOSIS — M54.12 CERVICAL RADICULOPATHY: ICD-10-CM

## 2025-07-16 DIAGNOSIS — G89.29 CHRONIC LEFT SHOULDER PAIN: ICD-10-CM

## 2025-07-16 DIAGNOSIS — F90.0 ATTENTION DEFICIT HYPERACTIVITY DISORDER (ADHD), PREDOMINANTLY INATTENTIVE TYPE: Primary | ICD-10-CM

## 2025-07-16 DIAGNOSIS — N95.1 PERIMENOPAUSAL: ICD-10-CM

## 2025-07-16 DIAGNOSIS — G44.86 CERVICOGENIC HEADACHE: ICD-10-CM

## 2025-07-16 DIAGNOSIS — Z51.81 MEDICATION MONITORING ENCOUNTER: ICD-10-CM

## 2025-07-16 LAB
ALBUMIN SERPL-MCNC: 4.3 G/DL (ref 3.5–5.2)
ALBUMIN/GLOB SERPL: 1.2 G/DL
ALP SERPL-CCNC: 93 U/L (ref 39–117)
ALT SERPL W P-5'-P-CCNC: 10 U/L (ref 1–33)
ANION GAP SERPL CALCULATED.3IONS-SCNC: 12.3 MMOL/L (ref 5–15)
AST SERPL-CCNC: 17 U/L (ref 1–32)
BASOPHILS # BLD AUTO: 0.11 10*3/MM3 (ref 0–0.2)
BASOPHILS NFR BLD AUTO: 1.2 % (ref 0–1.5)
BILIRUB CONJ SERPL-MCNC: <0.1 MG/DL (ref 0–0.3)
BILIRUB SERPL-MCNC: 0.2 MG/DL (ref 0–1.2)
BUN SERPL-MCNC: 12 MG/DL (ref 6–20)
BUN/CREAT SERPL: 14 (ref 7–25)
CALCIUM SPEC-SCNC: 9.6 MG/DL (ref 8.6–10.5)
CHLORIDE SERPL-SCNC: 103 MMOL/L (ref 98–107)
CO2 SERPL-SCNC: 24.7 MMOL/L (ref 22–29)
CREAT SERPL-MCNC: 0.86 MG/DL (ref 0.57–1)
DEPRECATED RDW RBC AUTO: 40.4 FL (ref 37–54)
EGFRCR SERPLBLD CKD-EPI 2021: 83.5 ML/MIN/1.73
EOSINOPHIL # BLD AUTO: 0.09 10*3/MM3 (ref 0–0.4)
EOSINOPHIL NFR BLD AUTO: 1 % (ref 0.3–6.2)
ERYTHROCYTE [DISTWIDTH] IN BLOOD BY AUTOMATED COUNT: 13 % (ref 12.3–15.4)
FSH SERPL-ACNC: 5.5 MIU/ML
GLOBULIN UR ELPH-MCNC: 3.5 GM/DL
GLUCOSE SERPL-MCNC: 83 MG/DL (ref 65–99)
HBA1C MFR BLD: 5.9 % (ref 4.8–5.6)
HCT VFR BLD AUTO: 37.6 % (ref 34–46.6)
HGB BLD-MCNC: 12.5 G/DL (ref 12–15.9)
IMM GRANULOCYTES # BLD AUTO: 0.03 10*3/MM3 (ref 0–0.05)
IMM GRANULOCYTES NFR BLD AUTO: 0.3 % (ref 0–0.5)
LH SERPL-ACNC: 6.44 MIU/ML
LYMPHOCYTES # BLD AUTO: 2.47 10*3/MM3 (ref 0.7–3.1)
LYMPHOCYTES NFR BLD AUTO: 26.4 % (ref 19.6–45.3)
MCH RBC QN AUTO: 28.7 PG (ref 26.6–33)
MCHC RBC AUTO-ENTMCNC: 33.2 G/DL (ref 31.5–35.7)
MCV RBC AUTO: 86.2 FL (ref 79–97)
MONOCYTES # BLD AUTO: 0.77 10*3/MM3 (ref 0.1–0.9)
MONOCYTES NFR BLD AUTO: 8.2 % (ref 5–12)
NEUTROPHILS NFR BLD AUTO: 5.87 10*3/MM3 (ref 1.7–7)
NEUTROPHILS NFR BLD AUTO: 62.9 % (ref 42.7–76)
NRBC BLD AUTO-RTO: 0 /100 WBC (ref 0–0.2)
PLATELET # BLD AUTO: 334 10*3/MM3 (ref 140–450)
PMV BLD AUTO: 11.3 FL (ref 6–12)
POTASSIUM SERPL-SCNC: 3.8 MMOL/L (ref 3.5–5.2)
PROT SERPL-MCNC: 7.8 G/DL (ref 6–8.5)
RBC # BLD AUTO: 4.36 10*6/MM3 (ref 3.77–5.28)
SODIUM SERPL-SCNC: 140 MMOL/L (ref 136–145)
WBC NRBC COR # BLD AUTO: 9.34 10*3/MM3 (ref 3.4–10.8)

## 2025-07-16 PROCEDURE — 86003 ALLG SPEC IGE CRUDE XTRC EA: CPT | Performed by: FAMILY MEDICINE

## 2025-07-16 PROCEDURE — 86008 ALLG SPEC IGE RECOMB EA: CPT | Performed by: FAMILY MEDICINE

## 2025-07-16 PROCEDURE — 97110 THERAPEUTIC EXERCISES: CPT

## 2025-07-16 PROCEDURE — 85025 COMPLETE CBC W/AUTO DIFF WBC: CPT | Performed by: FAMILY MEDICINE

## 2025-07-16 PROCEDURE — 99214 OFFICE O/P EST MOD 30 MIN: CPT | Performed by: FAMILY MEDICINE

## 2025-07-16 PROCEDURE — 80307 DRUG TEST PRSMV CHEM ANLYZR: CPT | Performed by: FAMILY MEDICINE

## 2025-07-16 PROCEDURE — 80053 COMPREHEN METABOLIC PANEL: CPT | Performed by: FAMILY MEDICINE

## 2025-07-16 PROCEDURE — 86258 DGP ANTIBODY EACH IG CLASS: CPT | Performed by: FAMILY MEDICINE

## 2025-07-16 PROCEDURE — 86364 TISS TRNSGLTMNASE EA IG CLAS: CPT | Performed by: FAMILY MEDICINE

## 2025-07-16 PROCEDURE — 82784 ASSAY IGA/IGD/IGG/IGM EACH: CPT | Performed by: FAMILY MEDICINE

## 2025-07-16 PROCEDURE — 83036 HEMOGLOBIN GLYCOSYLATED A1C: CPT | Performed by: FAMILY MEDICINE

## 2025-07-16 PROCEDURE — 86231 EMA EACH IG CLASS: CPT | Performed by: FAMILY MEDICINE

## 2025-07-16 PROCEDURE — 97140 MANUAL THERAPY 1/> REGIONS: CPT

## 2025-07-16 PROCEDURE — 82248 BILIRUBIN DIRECT: CPT | Performed by: FAMILY MEDICINE

## 2025-07-16 PROCEDURE — 82785 ASSAY OF IGE: CPT | Performed by: FAMILY MEDICINE

## 2025-07-16 PROCEDURE — 83002 ASSAY OF GONADOTROPIN (LH): CPT | Performed by: FAMILY MEDICINE

## 2025-07-16 PROCEDURE — 83001 ASSAY OF GONADOTROPIN (FSH): CPT | Performed by: FAMILY MEDICINE

## 2025-07-16 RX ORDER — TRETINOIN 0.25 MG/G
CREAM TOPICAL
COMMUNITY
Start: 2025-07-08

## 2025-07-16 RX ORDER — LISDEXAMFETAMINE DIMESYLATE 20 MG/1
CAPSULE ORAL
COMMUNITY
Start: 2025-07-01

## 2025-07-16 NOTE — PROGRESS NOTES
Outpatient Physical Therapy  [address] [facility], [place] [address]                            Physical Therapy Daily Treatment Note    Patient: Maine Olguin   : 1975  Diagnosis/ICD-10 Code:  Chronic left shoulder pain [M25.512, G89.29]  Referring practitioner: Tanmay Mayberry DO  Date of Initial Visit: Type: THERAPY  Noted: 2025  Today's Date: 2025  Patient seen for 13 sessions           Subjective   Maine Olguin reports: that today is her last scheduled appt, overall she is much much better. Some busier days or days where she is on the computer she feels it in her shoulder and neck.   She does follow up with her PCP this afternoon, not sure if they will do imaging on the neck.   She wants to be done with therapy for now.    Objective   No complaints of increased pain or discomfort.    See Exercise, Manual, and Modality Logs for complete treatment.     Assessment/Plan  Maine progressing as evident by decreased overall L shoulder and neck pain. Pt tolerated exercises and manual well, no complaints of increased pain or discomfort. Pt to be discharged from therapy at this time.       Progress per Plan of Care         Timed:  Manual Therapy:    15     mins  25553;  Therapeutic Exercise:    15     mins  12621;     Neuromuscular Kaylan:        mins  89322;    Therapeutic Activity:          mins  02820;     Gait Training:           mins  85205;      Untimed:  Electrical Stimulation:         mins  86239 ( );  Mechanical Traction:         mins  06183;     Timed Treatment:   30   mins   Total Treatment:     30   mins      Electronically signed:     Rosie Forman PTA , CSRS  Physical Therapist Assistant  Certified Stroke Rehabilitation Specialist    [facility] License #: [id number]

## 2025-07-16 NOTE — PROGRESS NOTES
Chief Complaint  Abdominal discomfort    Subjective          Maine Olguin presents to [facility]    History of Present Illness     History of Present Illness  The patient presents today for follow-up.    She has been experiencing left-sided neck pain, which she believes may be cervicogenic in nature. The pain occasionally extends to her jaw and eye, depending on the severity of her neck discomfort. She has a history of a neck injury from 5 years ago. During physical therapy for her shoulder, the focus shifted to her neck due to persistent pain. She was advised to discuss this with her provider. She has undergone physical therapy for her shoulder, which has significantly improved her mobility and range of motion. She estimates an 80% improvement in her condition. She is not interested in seeing orthopedics at this time. She has a history of a severe migraine in 2014, which was accompanied by left-sided head pain and dullness. Recently, she has noticed that the pain seems to follow nerve lines behind her ears and jaw, depending on the severity of her neck pain.    She experienced noemi-colored stools and orange, oily bowel movements for 3 days last week, but her bowel movements have since returned to normal. She continues to experience generalized abdominal discomfort, which she describes as a rumbly, gassy sensation without actual gas. She also reports intermittent nausea but no vomiting or diarrhea. She does not experience pain after eating. She still has her appendix. She recently had a colonoscopy, during which a small polyp was removed. She does not believe she has celiac disease. She has been consuming wheat for a week and feels better, with less inflammation.    She is currently taking Vyvanse for ADHD but is considering switching back to Adderall, which she found more effective. She plans to discuss this with her psychiatrist, Dr. Abram Medina at [facility]. She has been  "on Vyvanse for approximately 2 weeks.    She is currently taking Zoloft for anxiety and depression.    She is not taking vitamin D regularly. She has a history of rheumatic issues and believes most of her symptoms are due to perimenopause. She still has her ovaries but not the uterus. She has been experiencing night sweats, with the most severe episode occurring two nights ago when she woke up drenched in sweat. She received her pneumococcal vaccine 3 months ago. She had a mammogram done.    Social History:  Occupations: Urgent care clinician, teacher  Hobbies: Walking    PAST SURGICAL HISTORY:  Colonoscopy with polyp removal         Current Outpatient Medications   Medication Instructions    albuterol sulfate  (90 Base) MCG/ACT inhaler 2 puffs, Inhalation, Every 4 Hours PRN    ketoconazole (NIZORAL) 2 % cream 1 Application, Topical, 2 Times Daily    Lotemax 0.5 % ointment APPLY RICE SIZE AMOUNT TO RIGHT LOWER EYELID AND LEFT UPPER EYELID TWICE A DAY    sertraline (ZOLOFT) 50 mg, Every Morning    tretinoin (RETIN-A) 0.025 % cream APPLY A PEA SIZE AMOUNT TO FACE NIGHTLY FOR COSMETIC PURPOSES    Vyvanse 20 MG capsule        The following portions of the patient's history were reviewed and updated as appropriate: allergies, current medications, past family history, past medical history, past social history, past surgical history, and problem list.    Objective   Vital Signs:   /86   Pulse 88   Temp 98.1 °F (36.7 °C) (Oral)   Ht 170.2 cm (67\")   Wt 98.2 kg (216 lb 8 oz)   SpO2 99%   BMI 33.91 kg/m²     BP Readings from Last 3 Encounters:   07/16/25 128/86   06/30/25 123/72   03/19/25 112/76     Wt Readings from Last 3 Encounters:   07/16/25 98.2 kg (216 lb 8 oz)   06/30/25 96.8 kg (213 lb 6.5 oz)   03/19/25 99.2 kg (218 lb 9.6 oz)           Physical Exam  Vitals reviewed.   Constitutional:       Appearance: Normal appearance.   HENT:      Head: Normocephalic and atraumatic.      Right Ear: External " ear normal.      Left Ear: External ear normal.      Nose: Nose normal.   Eyes:      Conjunctiva/sclera: Conjunctivae normal.   Cardiovascular:      Rate and Rhythm: Normal rate and regular rhythm.      Heart sounds: No murmur heard.     No friction rub. No gallop.   Pulmonary:      Effort: Pulmonary effort is normal.      Breath sounds: Normal breath sounds. No wheezing or rhonchi.   Abdominal:      General: Bowel sounds are normal. There is no distension.      Palpations: Abdomen is soft. There is no mass.      Tenderness: There is no abdominal tenderness.      Hernia: No hernia is present.   Skin:     General: Skin is warm and dry.   Neurological:      Mental Status: She is alert and oriented to person, place, and time.      Cranial Nerves: No cranial nerve deficit.   Psychiatric:         Mood and Affect: Mood and affect normal.         Behavior: Behavior normal.         Thought Content: Thought content normal.         Judgment: Judgment normal.            Result Review :   The following data was reviewed by: Tanmay Mayberry DO on 07/16/2025:  Common labs          12/2/2024    07:55 3/13/2025    07:54   Common Labs   Glucose 88  87    BUN 11  15    Creatinine 0.96  0.95    Sodium 136  137    Potassium 4.0  4.2    Chloride 102  100    Calcium 9.1  9.7    Albumin 4.2  4.4    Total Bilirubin 0.4  0.5    Alkaline Phosphatase 86  97    AST (SGOT) 16  19    ALT (SGPT) 11  12    WBC 6.33  6.12    Hemoglobin 11.8  12.6    Hematocrit 37.6  38.5    Platelets 272  301    Total Cholesterol 202     Triglycerides 155     HDL Cholesterol 49     LDL Cholesterol  125     Hemoglobin A1C 6.10  5.90             Lab Results   Component Value Date    COVID19 NEGATIVE 05/17/2022    RAPSCRN Negative 05/03/2023    BILIRUBINUR Negative 05/18/2022       Results  Labs   - A1c: 5.9%   - NGUYEN: Negative   - C-reactive protein: Slightly elevated but trending downward   - FSH level: Still looks good    Imaging   - Colonoscopy: One 5 mm  polyp    Procedures        Assessment and Plan    Diagnoses and all orders for this visit:    1. Attention deficit hyperactivity disorder (ADHD), predominantly inattentive type (Primary)    2. Anxiety    3. Depression, unspecified depression type    4. Chronic neck pain  -     XR Spine Cervical Complete 4 or 5 View; Future  -     MRI Cervical Spine Without Contrast; Future    5. Cervical radiculopathy  -     XR Spine Cervical Complete 4 or 5 View; Future  -     MRI Cervical Spine Without Contrast; Future    6. Chronic left shoulder pain    7. Generalized abdominal pain    8. Prediabetes  -     CBC & Differential  -     Comprehensive Metabolic Panel  -     Hemoglobin A1c    9. Vitamin D deficiency    10. Perimenopausal  -     FSH & LH    11. Food allergy  -     Alpha-Gal IgE Panel  -     Allergens (52) Food  -     Celiac Comprehensive Panel    12. Medication monitoring encounter  -     Drug Screen 10 With / Conf, WB    13. Gregg-colored stools  -     CBC & Differential  -     Comprehensive Metabolic Panel  -     Bilirubin, Direct        Assessment & Plan  1. Chronic neck pain.  - Reports chronic neck pain, which she believes is cervicogenic in nature.  - Physical therapy has provided significant relief for her shoulder, but neck pain persists.  - An x-ray and MRI of the cervical spine will be ordered to further investigate the cause of her pain.  - Cervical root involvement suspected; MRI and x-ray cervical spine ordered.    2. Rotator cuff tendinopathy without evidence of tear.  - Diagnosed with rotator cuff tendinopathy without evidence of tear and mild arthritis in her shoulder.  - Reports significant improvement with physical therapy.  - Prefers to continue physical therapy rather than seeking orthopedic consultation at this time.  - No further orthopedic intervention planned; continue physical therapy.    3. Abdominal discomfort.  - Reports generalized abdominal discomfort, intermittent nausea, and previous  episodes of noemi-colored stools and oily bowel movements.  - Physical exam reveals no tenderness or pain upon palpation.  - Comprehensive set of labs including A1c, CBC, CMP, alpha-gal, food allergy testing, and celiac disease screening will be ordered today.  - Advised to maintain a food diary and consider a low FODMAP diet and dairy-free diet to identify potential triggers; CT scan considered if symptoms persist.    4. ADHD.  - Currently taking Vyvanse for ADHD but considering switching back to Adderall.  - Will discuss the switch with her psychiatrist before making any changes.  - Drug test will be conducted today in anticipation of potentially prescribing Adderall in the future.  - Monitoring ADHD symptoms and medication effectiveness; drug test ordered.    5. Anxiety and depression.  - Currently taking Zoloft for anxiety and depression.  - Continues to manage symptoms with medication.  - No changes to current treatment plan discussed.  - Monitoring response to Zoloft; no new interventions required.    6. Health maintenance.  - A1c level is 5.9%, indicating a prediabetic state.  - FSH level remains within the normal range; perimenopausal symptoms noted.  - Blood pressure readings are slightly elevated today at 128/86.  - Received pneumococcal vaccine 3 months ago; mammogram completed.  - Advised to continue regular exercise and reduce sugar intake to manage blood pressure.  - Monitoring prediabetic status and blood pressure; lifestyle modifications recommended.    Follow-up  - The patient will follow up in 3 months.       Medications Discontinued During This Encounter   Medication Reason    amphetamine-dextroamphetamine XR (ADDERALL XR) 10 MG 24 hr capsule Discontinued by another clinician          Follow Up   Return in about 3 months (around 10/16/2025) for Annual physical.  Patient was given instructions and counseling regarding her condition or for health maintenance advice. Please see specific information  pulled into the AVS if appropriate.     Patient or patient representative verbalized consent for the use of Ambient Listening during the visit with  Tanmay Mayberry DO for chart documentation. 7/16/2025  14:11 EDT    Tanmay Mayberry DO  07/16/25  14:29 EDT

## 2025-07-18 LAB
ENDOMYSIUM IGA SER QL: NEGATIVE
GLIADIN PEPTIDE IGA SER-ACNC: 5 UNITS (ref 0–19)
GLIADIN PEPTIDE IGG SER-ACNC: 4 UNITS (ref 0–19)
IGA SERPL-MCNC: 227 MG/DL (ref 87–352)
TTG IGA SER-ACNC: <2 U/ML (ref 0–3)
TTG IGG SER-ACNC: 4 U/ML (ref 0–5)

## 2025-07-21 LAB
ALPHA-GAL IGE QN: <0.1 KU/L
BEEF IGE QN: <0.1 KU/L
IGE SERPL-ACNC: 22 IU/ML (ref 6–495)
LAMB IGE QN: <0.1 KU/L
PORK IGE QN: <0.1 KU/L
SERVICE CMNT-IMP: NORMAL

## 2025-07-22 LAB
A-LACTALB IGE QN: <0.1 KU/L
ALMOND IGE QN: <0.1 KU/L
APPLE IGE QN: <0.1 KU/L
AVOCADO IGE QN: <0.1 KU/L
BAKER'S YEAST IGE QN: <0.1 KU/L
BANANA IGE QN: <0.1 KU/L
BEEF IGE QN: <0.1 KU/L
BLACK PEPPER IGE QN: <0.1 KU/L
BROCCOLI IGE QN: <0.1 KU/L
CABBAGE IGE QN: <0.1 KU/L
CARROT IGE QN: <0.1 KU/L
CASHEW NUT IGE QN: <0.1 KU/L
CHEESE MOLD IGE QN: <0.1 KU/L
CHICKEN MEAT IGE QN: <0.1 KU/L
COCOA IGE QN: <0.1 KU/L
COFFEE IGE QN: <0.1 KU/L
CORN IGE QN: <0.1 KU/L
COW MILK IGE QN: <0.1 KU/L
CRAB IGE QN: <0.1 KU/L
EGG WHITE IGE QN: <0.1 KU/L
EGG YOLK IGE QN: <0.1 KU/L
GARLIC IGE QN: <0.1 KU/L
GRAPE IGE QN: <0.1 KU/L
GREEN BEAN IGE QN: <0.1 KU/L
HADDOCK IGE QN: <0.1 KU/L
HALIBUT IGE QN: <0.1 KU/L
HAZELNUT IGE QN: <0.1 KU/L
LAMB IGE QN: <0.1 KU/L
LEMON IGE QN: <0.1 KU/L
MELON IGE QN: <0.1 KU/L
MUSHROOM IGE QN: <0.1 KU/L
OAT IGE QN: <0.1 KU/L
ONION IGE QN: <0.1 KU/L
ORANGE IGE QN: <0.1 KU/L
OYSTER IGE QN: <0.1 KU/L
PAPRIKA IGE QN: <0.1 KU/L
PEANUT IGE QN: <0.1 KU/L
PORK IGE QN: <0.1 KU/L
POTATO IGE QN: <0.1 KU/L
RICE IGE QN: <0.1 KU/L
RYE IGE QN: <0.1 KU/L
SALMON IGE QN: <0.1 KU/L
SERVICE CMNT-IMP: NORMAL
SHRIMP IGE QN: <0.1 KU/L
SOYBEAN IGE QN: <0.1 KU/L
STRAWBERRY IGE QN: <0.1 KU/L
TEA IGE QN: <0.1 KU/L
TOMATO IGE QN: <0.1 KU/L
TUNA IGE QN: <0.1 KU/L
TURKEY MEAT IGE QN: <0.1 KU/L
VANILLA IGE QN: <0.1 KU/L
WHEAT IGE QN: <0.1 KU/L
WHOLE EGG IGE QN: <0.1 KU/L

## 2025-07-23 LAB
AMPHETAMINES BLD QL SCN: NEGATIVE NG/ML
BARBITURATES SERPLBLD QL: NEGATIVE UG/ML
BENZODIAZ BLD QL: NEGATIVE NG/ML
CANNABINOIDS BLD QL SCN: NEGATIVE NG/ML
COCAINE+BZE SERPLBLD QL SCN: NEGATIVE NG/ML
METHADONE BLD QL SCN: NEGATIVE NG/ML
OPIATES BLD QL SCN: NEGATIVE NG/ML
OXYCODONE BLD QL SCN: NEGATIVE NG/ML
PCP BLD QL SCN: NEGATIVE NG/ML
PROPOXYPH BLD QL SCN: NEGATIVE NG/ML

## 2025-08-11 DIAGNOSIS — Z00.00 VISIT FOR ANNUAL HEALTH EXAMINATION: Primary | ICD-10-CM

## 2025-08-26 ENCOUNTER — HOSPITAL ENCOUNTER (OUTPATIENT)
Dept: GENERAL RADIOLOGY | Facility: HOSPITAL | Age: 50
Discharge: HOME | End: 2025-08-26
Payer: OTHER GOVERNMENT

## 2025-08-26 ENCOUNTER — HOSPITAL ENCOUNTER (OUTPATIENT)
Dept: MRI IMAGING | Facility: HOSPITAL | Age: 50
Discharge: HOME | End: 2025-08-26
Payer: OTHER GOVERNMENT

## 2025-08-26 DIAGNOSIS — M54.2 CHRONIC NECK PAIN: ICD-10-CM

## 2025-08-26 DIAGNOSIS — M54.12 CERVICAL RADICULOPATHY: ICD-10-CM

## 2025-08-26 DIAGNOSIS — G89.29 CHRONIC NECK PAIN: ICD-10-CM

## 2025-08-26 PROCEDURE — 72050 X-RAY EXAM NECK SPINE 4/5VWS: CPT

## 2025-08-26 PROCEDURE — 72141 MRI NECK SPINE W/O DYE: CPT

## (undated) DEVICE — Device

## (undated) DEVICE — SOL IRRG H2O PL/BG 1000ML STRL

## (undated) DEVICE — SOLIDIFIER LIQLOC PLS 1500CC BT

## (undated) DEVICE — THE STERILE LIGHT HANDLE COVER IS USED WITH STERIS SURGICAL LIGHTING AND VISUALIZATION SYSTEMS.

## (undated) DEVICE — SNAR E/S POLYP SNAREMASTER OVL/10MM 2.8X2300MM YEL

## (undated) DEVICE — LINER SURG CANSTR SXN S/RIGD 1500CC

## (undated) DEVICE — THE SINGLE USE ETRAP – POLYP TRAP IS USED FOR SUCTION RETRIEVAL OF ENDOSCOPICALLY REMOVED POLYPS.: Brand: ETRAP

## (undated) DEVICE — DEFENDO AIR WATER SUCTION AND BIOPSY VALVE KIT FOR  OLYMPUS: Brand: DEFENDO AIR/WATER/SUCTION AND BIOPSY VALVE

## (undated) DEVICE — CONN JET HYDRA H20 AUXILIARY DISP